# Patient Record
Sex: MALE | Race: WHITE | URBAN - METROPOLITAN AREA
[De-identification: names, ages, dates, MRNs, and addresses within clinical notes are randomized per-mention and may not be internally consistent; named-entity substitution may affect disease eponyms.]

---

## 2017-06-10 ENCOUNTER — OFFICE VISIT (OUTPATIENT)
Dept: URGENT CARE | Facility: URGENT CARE | Age: 77
End: 2017-06-10
Payer: MEDICARE

## 2017-06-10 VITALS
WEIGHT: 169 LBS | OXYGEN SATURATION: 96 % | DIASTOLIC BLOOD PRESSURE: 80 MMHG | BODY MASS INDEX: 24.25 KG/M2 | RESPIRATION RATE: 16 BRPM | TEMPERATURE: 97.9 F | SYSTOLIC BLOOD PRESSURE: 120 MMHG | HEART RATE: 70 BPM

## 2017-06-10 DIAGNOSIS — R31.0 GROSS HEMATURIA: Primary | ICD-10-CM

## 2017-06-10 DIAGNOSIS — N30.01 ACUTE CYSTITIS WITH HEMATURIA: ICD-10-CM

## 2017-06-10 DIAGNOSIS — Z79.01 CHRONIC ANTICOAGULATION: ICD-10-CM

## 2017-06-10 DIAGNOSIS — Z85.51 H/O PRIMARY MALIGNANT NEOPLASM OF URINARY BLADDER: ICD-10-CM

## 2017-06-10 DIAGNOSIS — R82.90 NONSPECIFIC FINDING ON EXAMINATION OF URINE: ICD-10-CM

## 2017-06-10 LAB
ALBUMIN UR-MCNC: 30 MG/DL
APPEARANCE UR: ABNORMAL
BACTERIA #/AREA URNS HPF: ABNORMAL /HPF
BILIRUB UR QL STRIP: NEGATIVE
COLOR UR AUTO: ABNORMAL
GLUCOSE UR STRIP-MCNC: NEGATIVE MG/DL
HGB UR QL STRIP: ABNORMAL
KETONES UR STRIP-MCNC: NEGATIVE MG/DL
LEUKOCYTE ESTERASE UR QL STRIP: ABNORMAL
NITRATE UR QL: NEGATIVE
PH UR STRIP: 5.5 PH (ref 5–7)
RBC #/AREA URNS AUTO: >100 /HPF (ref 0–2)
SP GR UR STRIP: 1.01 (ref 1–1.03)
URN SPEC COLLECT METH UR: ABNORMAL
UROBILINOGEN UR STRIP-ACNC: 0.2 EU/DL (ref 0.2–1)
WBC #/AREA URNS AUTO: ABNORMAL /HPF (ref 0–2)

## 2017-06-10 PROCEDURE — 87088 URINE BACTERIA CULTURE: CPT | Performed by: PHYSICIAN ASSISTANT

## 2017-06-10 PROCEDURE — 87086 URINE CULTURE/COLONY COUNT: CPT | Performed by: PHYSICIAN ASSISTANT

## 2017-06-10 PROCEDURE — 87186 SC STD MICRODIL/AGAR DIL: CPT | Performed by: PHYSICIAN ASSISTANT

## 2017-06-10 PROCEDURE — 81001 URINALYSIS AUTO W/SCOPE: CPT | Performed by: FAMILY MEDICINE

## 2017-06-10 PROCEDURE — 99214 OFFICE O/P EST MOD 30 MIN: CPT | Performed by: PHYSICIAN ASSISTANT

## 2017-06-10 RX ORDER — CEFDINIR 300 MG/1
300 CAPSULE ORAL 2 TIMES DAILY
Qty: 20 CAPSULE | Refills: 0 | Status: SHIPPED | OUTPATIENT
Start: 2017-06-10 | End: 2018-05-16

## 2017-06-10 NOTE — PROGRESS NOTES
" Carlos Alberto Ortiz presents to  today for evaluation of small volume gross hematuria (bright red blood) with urination x 2 days duration. His wife accompanies him today. His primary residence is in [place] but he spends time in [place] over the summer months. His [place] PCP and all other medical specialists are in [place]. He does, however, see an MD at [facility] when here over the summer months and has an already scheduled appointment there for next week. He decided he \"probably shouldn't wait that long with the blood in my urine\" so is here now.     He denies passing any blood clots or tissue. He denies any actual dysuria, urinary urgency/frequency, obstructive voiding sxs or  suprapubic area pressure.Denies any abdominal pain or flank pain. Denies any F/C/N/V/D or other acute sxs.    UROLOGIC PMH: He has a significant past urologic hx that includes bladder cancer.  Patient tells me he had transurethral resection of a tumor \"that was 2/3 the size of my bladder\" 3 years ago. He reportedly had a urology recheck 3-4 months ago (including cysto by his report) and states, \"He told me everything was all clear. No cancer.\"     General Disposition. Despite above acute illness sxs, patient still alert, active and taking in PO solids and fluids.  Normal BM's          ROS:     GI: Denies any abdominal pain. Denies any F/C/N/V/D.   CARDIAC: Significant cardiac pmhx as per below. Denies any active CP or change in baseline SOB. Denies any acute changes or changes for many months to one year in longstanding SOB with exertion. Denies any increase in LE edema or weight gain  RESP: Severe COPD. O2 dependent. Denies any acute changes in respiratory status or any changes for many months to one year in longstanding SOB with exertion.   SKIN: Denies rash   NEURO: Denies any high fevers, confusion, weakness or mental status changes    Past Medical History:   Diagnosis Date     Atrial fibrillation (H) 7/1/2016     Biventricular ICD (implantable " cardioverter-defibrillator) in place 7/1/2016     Cancer (H)      Cardiomyopathy, unspecified (H) 7/1/2016     CHF (congestive heart failure) (H) 6/27/2016     COPD (chronic obstructive pulmonary disease) (H)      Coronary artery disease      Hx of atrioventricular node ablation 7/1/2016     Uncomplicated asthma        Current Outpatient Prescriptions:      albuterol (2.5 MG/3ML) 0.083% nebulizer solution, Take 1 vial (2.5 mg) by nebulization 4 times daily, Disp: 360 mL, Rfl: 3     dextromethorphan-guaiFENesin (MUCINEX DM)  MG per 12 hr tablet, Take 1 tablet by mouth 2 times daily, Disp: 180 tablet, Rfl: 3     furosemide (LASIX) 40 MG tablet, Take 1 tablet (40 mg) by mouth 2 times daily, Disp: 180 tablet, Rfl: 3     predniSONE (DELTASONE) 10 MG tablet, Take 4 tablets daily for 3 days then take 3 daily for 3 days then 2 daily for 3 days then 1 daily for three days then stop, Disp: 30 tablet, Rfl: 3     diphenhydrAMINE (BENADRYL) 50 MG capsule, Take 50 mg by mouth every 6 hours as needed for sleep , Disp: , Rfl:      predniSONE (DELTASONE) 5 MG tablet, Take 5 mg by mouth daily, Disp: , Rfl:      fluticasone-salmeterol (ADVAIR DISKUS) 250-50 MCG/DOSE diskus inhaler, Inhale 1 puff into the lungs 2 times daily, Disp: , Rfl:      tiotropium (SPIRIVA) 18 MCG inhalation capsule, Inhale 18 mcg into the lungs daily, Disp: , Rfl:      citalopram (CELEXA) 10 MG tablet, Take 10 mg by mouth daily, Disp: , Rfl:      metoprolol (TOPROL-XL) 100 MG 24 hr tablet, Take 100 mg by mouth daily, Disp: , Rfl:      tamsulosin (FLOMAX) 0.4 MG 24 hr capsule, Take 0.4 mg by mouth daily, Disp: , Rfl:      ramipril (ALTACE) 10 MG capsule, Take 10 mg by mouth daily, Disp: , Rfl:      montelukast (SINGULAIR) 10 MG tablet, Take 10 mg by mouth At Bedtime, Disp: , Rfl:      atorvastatin (LIPITOR) 20 MG tablet, Take 20 mg by mouth daily, Disp: , Rfl:      finasteride (PROSCAR) 5 MG tablet, Take 5 mg by mouth daily, Disp: , Rfl:      warfarin  (COUMADIN) 5 MG tablet, Take by mouth daily 5mg M,W,F, & 7.5mg all other days, Disp: , Rfl:      ALPRAZolam (XANAX) 0.5 MG tablet, Take 0.5 mg by mouth 3 times daily as needed , Disp: , Rfl:      albuterol (2.5 MG/3ML) 0.083% nebulizer solution, Take 1 vial by nebulization 2 times daily, Disp: , Rfl:     No Known Allergies     OBJECTIVE:  /80 (BP Location: Right arm, Cuff Size: Adult Large)  Pulse 70  Temp 97.9  F (36.6  C) (Oral)  Resp 16  Wt 169 lb (76.7 kg)  SpO2 96%  BMI 24.25 kg/m2      GENERAL:  Very pleasant, comfortable and generally well appearing sitting in exam chair. He is O2 dependent.  SKIN: No rashes.  Normal color.  Sclera clear.  CARDIAC:NORMAL - regular rate and rhythm. Heart sounds are distant due to COPD but no  extra heart sounds appreciated.   NECK:  No carotid bruits or JVD.  No thyromegaly or adenopathy.  RESP: Generally decreased breath sounds and wheezing throughtout  ABDOMEN:  Soft, non-tender, non-distended.  Positive normal bowel sounds.  No HSM or masses.  No suprapubic tenderness.  No CVA tenderness on either sided today with robust percussion.  NEURO: Alert and oriented.  Normal speech and mentation.  CN II/XII grossly intact.  Gait within normal limits.    EXTREMITIES:  Free of edema  NEURO: Alert and oriented.  Normal speech and mentation.  CN II/XII grossly intact.  Gait within normal limits.        Component      Latest Ref Rng & Units 6/10/2017   Color Urine       Red   Appearance Urine       Slightly Cloudy   Glucose Urine      NEG mg/dL Negative   Bilirubin Urine      NEG Negative   Ketones Urine      NEG mg/dL Negative   Specific Gravity Urine      1.003 - 1.035 1.015   Blood Urine      NEG Large (A)   pH Urine      5.0 - 7.0 pH 5.5   Protein Albumin Urine      NEG mg/dL 30 (A)   Urobilinogen Urine      0.2 - 1.0 EU/dL 0.2   Nitrite Urine      NEG Negative   Leukocyte Esterase Urine      NEG Small (A)   Source       Midstream Urine   WBC Urine      0 - 2 /HPF 25-50  "(A)   RBC Urine      0 - 2 /HPF >100 (A)   Bacteria Urine      NEG /HPF Moderate (A)           ASSESSMENT/PLAN:    Summary: Gross hematuria in a 77 y.o. Man with pmhx that includes large bladder CA. UA today does show findings consistent with probable infection.  It is reassuring that he had, by his report, a recent cystoscopy with negative findings.  However, I explained to patient and his wife today gross hematuria in the setting of h/o bladder CA needs close and short interval follow-up on Monday. He does not have any sxs or physical exam findings of pyelonephritis.  His other co-morbid medical conditions are stable/without change for many months to one year. The below printed plan (along with today's lab findings for him to hand carry to follow-up appointment on Monday) are provided to patient today.         6/10/17 Urgent Care Plan     1.  Start the Omnicef (Cefdinir) antibiotic I prescribed for your presumed bladder infection.     2.  Follow-up with your primary care doctor/primary care clinic on Monday for a recheck. Make sure your primary care doctor checks on your urine culture on Monday. Because you have a history of bladder cancer, it is very important you get this follow-up.     Your doctor will let you know if you need to be seen by a urologist.     3.  Talk to your INR nurse about taking an antibiotic as this can cause changes in your INR.     4.  If you develop any difficulty passing your urine, develop any blood clots in the urine, have any increased blood in urine, develop any abdominal pain, back pain, fever or any of the below symptoms:       Repeated vomiting; unable to keep medicine down     In addition to the above, bladder infection \"red flag\" signs and sxs are reviewed with pt both verbally and by way of printed educational material for home review.  Pt verbalizes understanding of and agrees to the above plan.       The below blue text is update of phone call follow-UP     Patient's UCX " "returned positive for 2 strains of multi-drug resistant E. Coli. Patient states he did not receive initial phone message for advised Abx change.  I personally reached him Wed morning (6/14/17). The below is copy of my phone conversation:     I phoned patient today myself.  He states he did not receive call yesterday.  I informed him he has two strains of multi-drug resistant bacteria in urine.      Patient states his gross hematuria resolved \"after the second pill\" (omnicef). He continues to deny any F/C/N/V/D, back pain, weakness, abdominal pain or any other acute illness sxs.     PLAN:      1.  Stop Omnicef   2. Start Septra DS now bid x 7 days   3. Patient tells me he already has a follow-up appointment scheduled to see his local PCP ([facility]) next Thursday.   4. I reviewed pyelonephritis and \"red flag\" sxs with him again on phone today and advised he needs to be seen STAT if any such sxs develop in setting of multi-drug resistant, multiple strain infection. He verbalizes understanding of my advise.   5.  Patient requested a copy of his urine culture report (so he can carry it to follow-up PCP visit) to be mailed to the below address:      [address]  [place] [address]        (R31.0) Gross hematuria  (primary encounter diagnosis)  Plan: UA reflex to Microscopic and Culture, Urine         Microscopic, Urine Culture Aerobic Bacterial      (Z85.51) H/O primary malignant neoplasm of urinary bladder  Plan: UA reflex to Microscopic and Culture, Urine         Microscopic, Urine Culture Aerobic Bacterial      (N30.01) Acute cystitis with hematuria  Plan: UA reflex to Microscopic and Culture, Urine         Microscopic, Urine Culture Aerobic Bacterial,         cefdinir (OMNICEF) 300 MG capsule,         sulfamethoxazole-trimethoprim (BACTRIM         DS/SEPTRA DS) 800-160 MG per tablet      (Z79.01) Chronic anticoagulation    (R82.90) Nonspecific finding on examination of urine  Plan: Urine " Culture Aerobic Bacterial

## 2017-06-10 NOTE — NURSING NOTE
"Carlos Alberto Ortiz is a 77 year old male.      Chief Complaint   Patient presents with     Urgent Care     Urinary Problem     pt is here for hemature that he thinks it may have started about 2 days ago - has a hx of bladder ca two years ago but that has been clear        Initial /80 (BP Location: Right arm, Cuff Size: Adult Large)  Pulse 70  Temp 97.9  F (36.6  C) (Oral)  Resp 16  Wt 169 lb (76.7 kg)  SpO2 96%  BMI 24.25 kg/m2 Estimated body mass index is 24.25 kg/(m^2) as calculated from the following:    Height as of 8/16/16: 5' 10\" (1.778 m).    Weight as of this encounter: 169 lb (76.7 kg).  Medication Reconciliation: complete      Questioned patient about current smoking habits.  Pt. quit smoking some time ago.      Zainab Clark CMA      "

## 2017-06-10 NOTE — PATIENT INSTRUCTIONS
"   * BLADDER INFECTION: Male (Adult)    A bladder infection (\"cystitis\" or \"UTI\") usually causes a constant urge to urinate, and a burning when passing urine. Urine may be cloudy, smelly or dark. There may be also be pain in the lower abdomen.  Cystitis in males is not common. It may be caused by a partial blockage in the urinary system that keeps the bladder from emptying completely. This is most often related to an enlarged prostate gland.  HOME CARE:  1. Drink lots of fluids (at least 6-8 glasses a day). This will flush the bacteria out of your bladder. Cranberry juice has been shown to help clear out the bacteria.  2. Avoid sexual intercourse until your symptoms are gone.  3. A bladder infection is treated with antibiotics. You may also be given Pyridium (generic - phenazopyridine) to reduce burning with urination. This will cause urine to become a bright orange color, which can stain clothing.  FOLLOW UP with your doctor or this facility if ALL symptoms have not cleared within five days. It is important to keep your follow up appointment to discuss with your doctor the need for further tests of the urinary tract.  GET PROMPT MEDICAL ATTENTION if any of the following occur:    Fever over 101  F (38.3  C)    No improvement by the third day of treatment    Increasing back or abdominal pain    Repeated vomiting; unable to keep medicine down    Weakness, dizziness or fainting    8330-2261 [employer], [address] [place], [place] [address]. All rights reserved. This information is not intended as a substitute for professional medical care. Always follow your healthcare professional's instructions.    6/10/17 Urgent Care Plan     1.  Start the Omnicef (Cefdinir) antibiotic I prescribed for your presumed bladder infection.     2.  Follow-up with your primary care doctor/primary care clinic on Monday for a recheck. Make sure your primary care doctor checks on your urine culture on Monday. Because you have a " history of bladder cancer, it is very important you get this follow-up.     Your doctor will let you know if you need to be seen by a urologist.     3.  Talk to your INR nurse about taking an antibiotic as this can cause changes in your INR.     4.  If you develop any difficulty passing your urine, develop any blood clots in the urine, have any increased blood in urine, develop any abdominal pain, back pain, fever or any of the below symptoms:       Repeated vomiting; unable to keep medicine down    Weakness, dizziness or fainting

## 2017-06-10 NOTE — MR AVS SNAPSHOT
"              After Visit Summary   6/10/2017    Carlos Alberto Ortiz    MRN: [id number]           Patient Information     Date Of Birth          1940        Visit Information        Provider Department      6/10/2017 2:20 PM Khoa Nunez PA-C [facility]        Today's Diagnoses     Acute cystitis with hematuria    -  1    Hematuria        Chronic anticoagulation        Nonspecific finding on examination of urine          Care Instructions       * BLADDER INFECTION: Male (Adult)    A bladder infection (\"cystitis\" or \"UTI\") usually causes a constant urge to urinate, and a burning when passing urine. Urine may be cloudy, smelly or dark. There may be also be pain in the lower abdomen.  Cystitis in males is not common. It may be caused by a partial blockage in the urinary system that keeps the bladder from emptying completely. This is most often related to an enlarged prostate gland.  HOME CARE:  1. Drink lots of fluids (at least 6-8 glasses a day). This will flush the bacteria out of your bladder. Cranberry juice has been shown to help clear out the bacteria.  2. Avoid sexual intercourse until your symptoms are gone.  3. A bladder infection is treated with antibiotics. You may also be given Pyridium (generic - phenazopyridine) to reduce burning with urination. This will cause urine to become a bright orange color, which can stain clothing.  FOLLOW UP with your doctor or this facility if ALL symptoms have not cleared within five days. It is important to keep your follow up appointment to discuss with your doctor the need for further tests of the urinary tract.  GET PROMPT MEDICAL ATTENTION if any of the following occur:    Fever over 101  F (38.3  C)    No improvement by the third day of treatment    Increasing back or abdominal pain    Repeated vomiting; unable to keep medicine down    Weakness, dizziness or fainting    5386-8161 [employer], [address] [place] [address], [place] " [address]. All rights reserved. This information is not intended as a substitute for professional medical care. Always follow your healthcare professional's instructions.    6/10/17 Urgent Care Plan     1.  Start the Omnicef (Cefdinir) antibiotic I prescribed for your presumed bladder infection.     2.  Follow-up with your primary care doctor/primary care clinic on Monday for a recheck. Make sure your primary care doctor checks on your urine culture on Monday. Because you have a history of bladder cancer, it is very important you get this follow-up.     Your doctor will let you know if you need to be seen by a urologist.     3.  Talk to your INR nurse about taking an antibiotic as this can cause changes in your INR.     4.  If you develop any difficulty passing your urine, develop any blood clots in the urine, have any increased blood in urine, develop any abdominal pain, back pain, fever or any of the below symptoms:       Repeated vomiting; unable to keep medicine down    Weakness, dizziness or fainting                Follow-ups after your visit        Your next 10 appointments already scheduled     Jul 13, 2017 12:30 PM CDT   US CAROTID RIGHT with [id number]   [facility] ([facility])    [address]  [facility] [address]   [phone]           Please bring a list of your medicines (including vitamins, minerals and over-the-counter drugs). Also, tell your doctor about any allergies you may have. Wear comfortable clothes and leave your valuables at home.  You do not need to do anything special to prepare for your exam.  Please call the Imaging Department at your exam site with any questions.            Jul 13, 2017  1:00 PM CDT   Return Visit with Diego Canseco MD   [facility] ([facility])    [address]  [facility] [address] [phone]             "  Who to contact     If you have questions or need follow up information about today's clinic visit or your schedule please contact [facility] directly at [phone].  Normal or non-critical lab and imaging results will be communicated to you by MyChart, letter or phone within 4 business days after the clinic has received the results. If you do not hear from us within 7 days, please contact the clinic through [facility] or phone. If you have a critical or abnormal lab result, we will notify you by phone as soon as possible.  Submit refill requests through [employer] or call your pharmacy and they will forward the refill request to us. Please allow 3 business days for your refill to be completed.          Additional Information About Your Visit        CodeEval Information     [employer] lets you send messages to your doctor, view your test results, renew your prescriptions, schedule appointments and more. To sign up, go to [email or web address] . Click on \"Log in\" on the left side of the screen, which will take you to the Welcome page. Then click on \"Sign up Now\" on the right side of the page.     You will be asked to enter the access code listed below, as well as some personal information. Please follow the directions to create your username and password.     Your access code is: [email or web address]  Expires: 2017  4:09 PM     Your access code will  in 90 days. If you need help or a new code, please call your [place] clinic or [phone].        Care EveryWhere ID     This is your Care EveryWhere ID. This could be used by other organizations to access your [facility] medical records  [id number]        Your Vitals Were     Pulse Temperature Respirations Pulse Oximetry BMI (Body Mass Index)       70 97.9  F (36.6  C) (Oral) 16 96% 24.25 kg/m2        Blood Pressure from Last 3 Encounters:   06/10/17 120/80   16 123/68   16 96/56    Weight from Last 3 Encounters:   06/10/17 169 lb (76.7 kg) "   08/16/16 165 lb (74.8 kg)   07/22/16 163 lb 3.2 oz (74 kg)              We Performed the Following     UA reflex to Microscopic and Culture     Urine Culture Aerobic Bacterial     Urine Microscopic          Today's Medication Changes          These changes are accurate as of: 6/10/17  4:09 PM.  If you have any questions, ask your nurse or doctor.               Start taking these medicines.        Dose/Directions    cefdinir 300 MG capsule   Commonly known as:  OMNICEF   Used for:  Acute cystitis with hematuria   Started by:  Khoa Nunez PA-C        Dose:  300 mg   Take 1 capsule (300 mg) by mouth 2 times daily   Quantity:  20 capsule   Refills:  0            Where to get your medicines      These medications were sent to [facility] [address] [facility] [address]  [address] [place] [address] , [facility] [address]     Phone:  [phone]     cefdinir 300 MG capsule                Primary Care Provider Fax #    [facility] [phone] [address]  [facility] [address]        Thank you!     Thank you for choosing [facility]  for your care. Our goal is always to provide you with excellent care. Hearing back from our patients is one way we can continue to improve our services. Please take a few minutes to complete the written survey that you may receive in the mail after your visit with us. Thank you!             Your Updated Medication List - Protect others around you: Learn how to safely use, store and throw away your medicines at www.disposemymeds.org.          This list is accurate as of: 6/10/17  4:09 PM.  Always use your most recent med list.                   Brand Name Dispense Instructions for use    ADVAIR DISKUS 250-50 MCG/DOSE diskus inhaler   Generic drug:  fluticasone-salmeterol      Inhale 1 puff into the lungs 2 times daily       * albuterol (2.5 MG/3ML) 0.083% neb solution      Take  1 vial by nebulization 2 times daily       * albuterol (2.5 MG/3ML) 0.083% neb solution     360 mL    Take 1 vial (2.5 mg) by nebulization 4 times daily       ALPRAZolam 0.5 MG tablet    XANAX     Take 0.5 mg by mouth 3 times daily as needed       cefdinir 300 MG capsule    OMNICEF    20 capsule    Take 1 capsule (300 mg) by mouth 2 times daily       citalopram 10 MG tablet    celeXA     Take 10 mg by mouth daily       COUMADIN 5 MG tablet   Generic drug:  warfarin      Take by mouth daily 5mg M,W,F, & 7.5mg all other days       dextromethorphan-guaiFENesin  MG per 12 hr tablet    MUCINEX DM    180 tablet    Take 1 tablet by mouth 2 times daily       diphenhydrAMINE 50 MG capsule    BENADRYL     Take 50 mg by mouth every 6 hours as needed for sleep       finasteride 5 MG tablet    PROSCAR     Take 5 mg by mouth daily       furosemide 40 MG tablet    LASIX    180 tablet    Take 1 tablet (40 mg) by mouth 2 times daily       LIPITOR 20 MG tablet   Generic drug:  atorvastatin      Take 20 mg by mouth daily       metoprolol 100 MG 24 hr tablet    TOPROL-XL     Take 100 mg by mouth daily       montelukast 10 MG tablet    SINGULAIR     Take 10 mg by mouth At Bedtime       * predniSONE 5 MG tablet    DELTASONE     Take 5 mg by mouth daily       * predniSONE 10 MG tablet    DELTASONE    30 tablet    Take 4 tablets daily for 3 days then take 3 daily for 3 days then 2 daily for 3 days then 1 daily for three days then stop       ramipril 10 MG capsule    ALTACE     Take 10 mg by mouth daily       tamsulosin 0.4 MG capsule    FLOMAX     Take 0.4 mg by mouth daily       tiotropium 18 MCG capsule    SPIRIVA     Inhale 18 mcg into the lungs daily       * Notice:  This list has 4 medication(s) that are the same as other medications prescribed for you. Read the directions carefully, and ask your doctor or other care provider to review them with you.

## 2017-06-13 ENCOUNTER — TELEPHONE (OUTPATIENT)
Dept: URGENT CARE | Facility: URGENT CARE | Age: 77
End: 2017-06-13

## 2017-06-13 NOTE — TELEPHONE ENCOUNTER
Culture and sensitivity data show two strains of E coli that have likely resistance to Omnicef.  Would recommend stopping Omnicef that was prescribed and starting Bactrim DS 1 tab PO BID x 7 days instead.  This Rx can be called to the pharmacy of the patient's choice with no refills.    --EJP--

## 2017-06-13 NOTE — TELEPHONE ENCOUNTER
Left message for patient to return call to the clinic.   Keyanna Segal CMA (AAMA) 6/13/2017 10:25 AM

## 2017-06-14 LAB
BACTERIA SPEC CULT: ABNORMAL
MICRO REPORT STATUS: ABNORMAL
MICROORGANISM SPEC CULT: ABNORMAL
MICROORGANISM SPEC CULT: ABNORMAL
SPECIMEN SOURCE: ABNORMAL

## 2017-06-14 RX ORDER — SULFAMETHOXAZOLE/TRIMETHOPRIM 800-160 MG
1 TABLET ORAL 2 TIMES DAILY
Qty: 14 TABLET | Refills: 0 | Status: SHIPPED | OUTPATIENT
Start: 2017-06-14 | End: 2018-05-16

## 2017-06-14 NOTE — TELEPHONE ENCOUNTER
"I phoned patient today myself.  He states he did not receive call yesterday.  I informed him he has two strains of multi-drug resistant bacteria in urine.     Patient states his gross hematuria resolved \"after the second pill\" (omnicef). He continues to deny any F/C/N/V/D, back pain, weakness, abdominal pain or any other acute illness sxs.    PLAN:     1.  Stop Omnicef   2. Start Septra DS now bid x 7 days   3. Patient tells me he already has a follow-up appointment scheduled to see his local PCP ([facility]) next Thursday.   4. I reviewed pyelonephritis and \"red flag\" sxs with him again on phone today and advised he needs to be seen STAT if any such sxs develop in setting of multi-drug resistant, multiple strain infection. He verbalizes understanding of my advise.   5.  Patient requested a copy of his urine culture report (so he can carry it to follow-up PCP visit) to be mailed to the below address:     [address]  [place] [address]  "

## 2017-06-14 NOTE — TELEPHONE ENCOUNTER
Letter mailed to patient.   Keyanna Segal CMA ([facility]) 6/14/2017 11:41 AM

## 2017-07-14 ENCOUNTER — TELEPHONE (OUTPATIENT)
Dept: OTHER | Facility: CLINIC | Age: 77
End: 2017-07-14

## 2017-07-14 NOTE — TELEPHONE ENCOUNTER
LM for pt informing him that he No Showed to his appt yesterday 7/13/17 with Dr. Canseco. Gave pt our number to give us a call back to reschedule this appt.

## 2018-05-16 ENCOUNTER — HOSPITAL ENCOUNTER (INPATIENT)
Facility: CLINIC | Age: 78
LOS: 3 days | Discharge: HOME | DRG: 190 | End: 2018-05-19
Attending: EMERGENCY MEDICINE | Admitting: INTERNAL MEDICINE
Payer: MEDICARE

## 2018-05-16 ENCOUNTER — APPOINTMENT (OUTPATIENT)
Dept: GENERAL RADIOLOGY | Facility: CLINIC | Age: 78
DRG: 190 | End: 2018-05-16
Attending: EMERGENCY MEDICINE
Payer: MEDICARE

## 2018-05-16 DIAGNOSIS — J44.1 COPD EXACERBATION (H): ICD-10-CM

## 2018-05-16 LAB
ANION GAP SERPL CALCULATED.3IONS-SCNC: 7 MMOL/L (ref 3–14)
BASOPHILS # BLD AUTO: 0.1 10E9/L (ref 0–0.2)
BASOPHILS NFR BLD AUTO: 0.4 %
BUN SERPL-MCNC: 18 MG/DL (ref 7–30)
CALCIUM SERPL-MCNC: 9 MG/DL (ref 8.5–10.1)
CHLORIDE SERPL-SCNC: 107 MMOL/L (ref 94–109)
CO2 BLDCOV-SCNC: 32 MMOL/L (ref 21–28)
CO2 BLDCOV-SCNC: 35 MMOL/L (ref 21–28)
CO2 SERPL-SCNC: 28 MMOL/L (ref 20–32)
CREAT SERPL-MCNC: 1.08 MG/DL (ref 0.66–1.25)
DIFFERENTIAL METHOD BLD: ABNORMAL
EOSINOPHIL # BLD AUTO: 0.1 10E9/L (ref 0–0.7)
EOSINOPHIL NFR BLD AUTO: 1 %
ERYTHROCYTE [DISTWIDTH] IN BLOOD BY AUTOMATED COUNT: 13.2 % (ref 10–15)
GFR SERPL CREATININE-BSD FRML MDRD: 66 ML/MIN/1.7M2
GLUCOSE SERPL-MCNC: 107 MG/DL (ref 70–99)
HCT VFR BLD AUTO: 40.4 % (ref 40–53)
HGB BLD-MCNC: 13 G/DL (ref 13.3–17.7)
IMM GRANULOCYTES # BLD: 0 10E9/L (ref 0–0.4)
IMM GRANULOCYTES NFR BLD: 0.3 %
LACTATE BLD-SCNC: 1 MMOL/L (ref 0.7–2.1)
LACTATE BLD-SCNC: 2.6 MMOL/L (ref 0.7–2.1)
LYMPHOCYTES # BLD AUTO: 1.3 10E9/L (ref 0.8–5.3)
LYMPHOCYTES NFR BLD AUTO: 10 %
MCH RBC QN AUTO: 30.2 PG (ref 26.5–33)
MCHC RBC AUTO-ENTMCNC: 32.2 G/DL (ref 31.5–36.5)
MCV RBC AUTO: 94 FL (ref 78–100)
MONOCYTES # BLD AUTO: 0.7 10E9/L (ref 0–1.3)
MONOCYTES NFR BLD AUTO: 5.9 %
NEUTROPHILS # BLD AUTO: 10.4 10E9/L (ref 1.6–8.3)
NEUTROPHILS NFR BLD AUTO: 82.4 %
NRBC # BLD AUTO: 0 10*3/UL
NRBC BLD AUTO-RTO: 0 /100
NT-PROBNP SERPL-MCNC: 1559 PG/ML (ref 0–1800)
PCO2 BLDV: 53 MM HG (ref 40–50)
PCO2 BLDV: 61 MM HG (ref 40–50)
PH BLDV: 7.36 PH (ref 7.32–7.43)
PH BLDV: 7.39 PH (ref 7.32–7.43)
PLATELET # BLD AUTO: 181 10E9/L (ref 150–450)
PO2 BLDV: 24 MM HG (ref 25–47)
PO2 BLDV: 35 MM HG (ref 25–47)
POTASSIUM SERPL-SCNC: 4.7 MMOL/L (ref 3.4–5.3)
RBC # BLD AUTO: 4.31 10E12/L (ref 4.4–5.9)
SAO2 % BLDV FROM PO2: 42 %
SAO2 % BLDV FROM PO2: 62 %
SODIUM SERPL-SCNC: 142 MMOL/L (ref 133–144)
TROPONIN I SERPL-MCNC: <0.015 UG/L (ref 0–0.04)
WBC # BLD AUTO: 12.6 10E9/L (ref 4–11)

## 2018-05-16 PROCEDURE — A9270 NON-COVERED ITEM OR SERVICE: HCPCS | Mod: GY | Performed by: EMERGENCY MEDICINE

## 2018-05-16 PROCEDURE — 84484 ASSAY OF TROPONIN QUANT: CPT | Performed by: EMERGENCY MEDICINE

## 2018-05-16 PROCEDURE — 25000128 H RX IP 250 OP 636: Performed by: EMERGENCY MEDICINE

## 2018-05-16 PROCEDURE — 71045 X-RAY EXAM CHEST 1 VIEW: CPT

## 2018-05-16 PROCEDURE — 99207 ZZC CDG-HISTORY COMP: MEETS EXP. PROB FOCUSED- DOWN CODED LACK OF PFSH: CPT | Performed by: INTERNAL MEDICINE

## 2018-05-16 PROCEDURE — 83605 ASSAY OF LACTIC ACID: CPT

## 2018-05-16 PROCEDURE — 25000132 ZZH RX MED GY IP 250 OP 250 PS 637: Mod: GY | Performed by: EMERGENCY MEDICINE

## 2018-05-16 PROCEDURE — 99221 1ST HOSP IP/OBS SF/LOW 40: CPT | Mod: AI | Performed by: INTERNAL MEDICINE

## 2018-05-16 PROCEDURE — 83880 ASSAY OF NATRIURETIC PEPTIDE: CPT | Performed by: EMERGENCY MEDICINE

## 2018-05-16 PROCEDURE — 25000125 ZZHC RX 250: Performed by: EMERGENCY MEDICINE

## 2018-05-16 PROCEDURE — 99285 EMERGENCY DEPT VISIT HI MDM: CPT | Mod: 25

## 2018-05-16 PROCEDURE — 80048 BASIC METABOLIC PNL TOTAL CA: CPT | Performed by: EMERGENCY MEDICINE

## 2018-05-16 PROCEDURE — 96361 HYDRATE IV INFUSION ADD-ON: CPT

## 2018-05-16 PROCEDURE — 25000125 ZZHC RX 250

## 2018-05-16 PROCEDURE — 85025 COMPLETE CBC W/AUTO DIFF WBC: CPT | Performed by: EMERGENCY MEDICINE

## 2018-05-16 PROCEDURE — 12000000 ZZH R&B MED SURG/OB

## 2018-05-16 PROCEDURE — 96374 THER/PROPH/DIAG INJ IV PUSH: CPT

## 2018-05-16 PROCEDURE — 93005 ELECTROCARDIOGRAM TRACING: CPT

## 2018-05-16 PROCEDURE — 94640 AIRWAY INHALATION TREATMENT: CPT

## 2018-05-16 PROCEDURE — 82803 BLOOD GASES ANY COMBINATION: CPT

## 2018-05-16 RX ORDER — FUROSEMIDE 40 MG
40 TABLET ORAL DAILY
COMMUNITY

## 2018-05-16 RX ORDER — MONTELUKAST SODIUM 10 MG/1
10 TABLET ORAL AT BEDTIME
Status: DISCONTINUED | OUTPATIENT
Start: 2018-05-16 | End: 2018-05-19 | Stop reason: HOSPADM

## 2018-05-16 RX ORDER — LORATADINE 10 MG/1
10 TABLET ORAL ONCE
Status: COMPLETED | OUTPATIENT
Start: 2018-05-16 | End: 2018-05-16

## 2018-05-16 RX ORDER — POLYETHYLENE GLYCOL 3350 17 G/17G
17 POWDER, FOR SOLUTION ORAL DAILY PRN
Status: DISCONTINUED | OUTPATIENT
Start: 2018-05-16 | End: 2018-05-19 | Stop reason: HOSPADM

## 2018-05-16 RX ORDER — ONDANSETRON 2 MG/ML
4 INJECTION INTRAMUSCULAR; INTRAVENOUS EVERY 6 HOURS PRN
Status: DISCONTINUED | OUTPATIENT
Start: 2018-05-16 | End: 2018-05-19 | Stop reason: HOSPADM

## 2018-05-16 RX ORDER — METOPROLOL SUCCINATE 50 MG/1
50 TABLET, EXTENDED RELEASE ORAL 2 TIMES DAILY
COMMUNITY

## 2018-05-16 RX ORDER — FUROSEMIDE 40 MG
40 TABLET ORAL
Status: DISCONTINUED | OUTPATIENT
Start: 2018-05-17 | End: 2018-05-17

## 2018-05-16 RX ORDER — ALBUTEROL SULFATE 0.83 MG/ML
1 SOLUTION RESPIRATORY (INHALATION) 4 TIMES DAILY
Status: DISCONTINUED | OUTPATIENT
Start: 2018-05-17 | End: 2018-05-19 | Stop reason: HOSPADM

## 2018-05-16 RX ORDER — FUROSEMIDE 40 MG
40 TABLET ORAL DAILY PRN
Status: ON HOLD | COMMUNITY
End: 2018-05-19

## 2018-05-16 RX ORDER — METOPROLOL SUCCINATE 50 MG/1
50 TABLET, EXTENDED RELEASE ORAL 2 TIMES DAILY
Status: DISCONTINUED | OUTPATIENT
Start: 2018-05-16 | End: 2018-05-19 | Stop reason: HOSPADM

## 2018-05-16 RX ORDER — ALBUTEROL SULFATE 0.83 MG/ML
SOLUTION RESPIRATORY (INHALATION)
Status: COMPLETED
Start: 2018-05-16 | End: 2018-05-16

## 2018-05-16 RX ORDER — SODIUM CHLORIDE 9 MG/ML
INJECTION, SOLUTION INTRAVENOUS CONTINUOUS
Status: DISCONTINUED | OUTPATIENT
Start: 2018-05-16 | End: 2018-05-16

## 2018-05-16 RX ORDER — IPRATROPIUM BROMIDE AND ALBUTEROL SULFATE 2.5; .5 MG/3ML; MG/3ML
3 SOLUTION RESPIRATORY (INHALATION) ONCE
Status: COMPLETED | OUTPATIENT
Start: 2018-05-16 | End: 2018-05-16

## 2018-05-16 RX ORDER — FINASTERIDE 5 MG/1
5 TABLET, FILM COATED ORAL DAILY
Status: DISCONTINUED | OUTPATIENT
Start: 2018-05-17 | End: 2018-05-19 | Stop reason: HOSPADM

## 2018-05-16 RX ORDER — TAMSULOSIN HYDROCHLORIDE 0.4 MG/1
0.4 CAPSULE ORAL EVERY EVENING
Status: DISCONTINUED | OUTPATIENT
Start: 2018-05-16 | End: 2018-05-19 | Stop reason: HOSPADM

## 2018-05-16 RX ORDER — ALBUTEROL SULFATE 5 MG/ML
2.5 SOLUTION RESPIRATORY (INHALATION) ONCE
Status: COMPLETED | OUTPATIENT
Start: 2018-05-16 | End: 2018-05-16

## 2018-05-16 RX ORDER — ALBUTEROL SULFATE 90 UG/1
1-2 AEROSOL, METERED RESPIRATORY (INHALATION) EVERY 6 HOURS PRN
COMMUNITY

## 2018-05-16 RX ORDER — NALOXONE HYDROCHLORIDE 0.4 MG/ML
.1-.4 INJECTION, SOLUTION INTRAMUSCULAR; INTRAVENOUS; SUBCUTANEOUS
Status: DISCONTINUED | OUTPATIENT
Start: 2018-05-16 | End: 2018-05-19 | Stop reason: HOSPADM

## 2018-05-16 RX ORDER — METHYLPREDNISOLONE SODIUM SUCCINATE 125 MG/2ML
60 INJECTION, POWDER, LYOPHILIZED, FOR SOLUTION INTRAMUSCULAR; INTRAVENOUS ONCE
Status: COMPLETED | OUTPATIENT
Start: 2018-05-16 | End: 2018-05-16

## 2018-05-16 RX ORDER — LIDOCAINE 40 MG/G
CREAM TOPICAL
Status: DISCONTINUED | OUTPATIENT
Start: 2018-05-16 | End: 2018-05-19 | Stop reason: HOSPADM

## 2018-05-16 RX ORDER — ALPRAZOLAM 0.5 MG
0.5 TABLET ORAL 2 TIMES DAILY
Status: DISCONTINUED | OUTPATIENT
Start: 2018-05-16 | End: 2018-05-19 | Stop reason: HOSPADM

## 2018-05-16 RX ORDER — POTASSIUM CHLORIDE 750 MG/1
10 TABLET, EXTENDED RELEASE ORAL DAILY
COMMUNITY

## 2018-05-16 RX ORDER — IPRATROPIUM BROMIDE AND ALBUTEROL SULFATE 2.5; .5 MG/3ML; MG/3ML
SOLUTION RESPIRATORY (INHALATION)
Status: DISCONTINUED
Start: 2018-05-16 | End: 2018-05-16 | Stop reason: HOSPADM

## 2018-05-16 RX ORDER — DEXTROSE MONOHYDRATE 25 G/50ML
25-50 INJECTION, SOLUTION INTRAVENOUS
Status: DISCONTINUED | OUTPATIENT
Start: 2018-05-16 | End: 2018-05-19 | Stop reason: HOSPADM

## 2018-05-16 RX ORDER — RAMIPRIL 5 MG/1
10 CAPSULE ORAL EVERY EVENING
Status: DISCONTINUED | OUTPATIENT
Start: 2018-05-16 | End: 2018-05-19 | Stop reason: HOSPADM

## 2018-05-16 RX ORDER — CITALOPRAM HYDROBROMIDE 10 MG/1
10 TABLET ORAL EVERY EVENING
Status: DISCONTINUED | OUTPATIENT
Start: 2018-05-16 | End: 2018-05-19 | Stop reason: HOSPADM

## 2018-05-16 RX ORDER — PREDNISONE 10 MG/1
10 TABLET ORAL 2 TIMES DAILY
COMMUNITY

## 2018-05-16 RX ORDER — ONDANSETRON 4 MG/1
4 TABLET, ORALLY DISINTEGRATING ORAL EVERY 6 HOURS PRN
Status: DISCONTINUED | OUTPATIENT
Start: 2018-05-16 | End: 2018-05-19 | Stop reason: HOSPADM

## 2018-05-16 RX ORDER — METHYLPREDNISOLONE SODIUM SUCCINATE 125 MG/2ML
60 INJECTION, POWDER, LYOPHILIZED, FOR SOLUTION INTRAMUSCULAR; INTRAVENOUS 2 TIMES DAILY
Status: DISCONTINUED | OUTPATIENT
Start: 2018-05-17 | End: 2018-05-19 | Stop reason: HOSPADM

## 2018-05-16 RX ORDER — NIFEDIPINE 30 MG/1
60 TABLET, EXTENDED RELEASE ORAL EVERY EVENING
Status: DISCONTINUED | OUTPATIENT
Start: 2018-05-16 | End: 2018-05-19 | Stop reason: HOSPADM

## 2018-05-16 RX ORDER — NITROGLYCERIN 0.4 MG/1
0.4 TABLET SUBLINGUAL EVERY 5 MIN PRN
Status: DISCONTINUED | OUTPATIENT
Start: 2018-05-16 | End: 2018-05-19 | Stop reason: HOSPADM

## 2018-05-16 RX ORDER — METFORMIN HCL 500 MG
500 TABLET, EXTENDED RELEASE 24 HR ORAL
COMMUNITY

## 2018-05-16 RX ORDER — ATORVASTATIN CALCIUM 20 MG/1
20 TABLET, FILM COATED ORAL DAILY
Status: DISCONTINUED | OUTPATIENT
Start: 2018-05-17 | End: 2018-05-19 | Stop reason: HOSPADM

## 2018-05-16 RX ORDER — ACETAMINOPHEN 325 MG/1
650 TABLET ORAL EVERY 4 HOURS PRN
Status: DISCONTINUED | OUTPATIENT
Start: 2018-05-16 | End: 2018-05-19 | Stop reason: HOSPADM

## 2018-05-16 RX ORDER — NICOTINE POLACRILEX 4 MG
15-30 LOZENGE BUCCAL
Status: DISCONTINUED | OUTPATIENT
Start: 2018-05-16 | End: 2018-05-19 | Stop reason: HOSPADM

## 2018-05-16 RX ORDER — IPRATROPIUM BROMIDE AND ALBUTEROL SULFATE 2.5; .5 MG/3ML; MG/3ML
SOLUTION RESPIRATORY (INHALATION)
Status: COMPLETED
Start: 2018-05-16 | End: 2018-05-16

## 2018-05-16 RX ADMIN — ALBUTEROL SULFATE 2.5 MG: 2.5 SOLUTION RESPIRATORY (INHALATION) at 20:06

## 2018-05-16 RX ADMIN — IPRATROPIUM BROMIDE AND ALBUTEROL SULFATE 3 ML: .5; 3 SOLUTION RESPIRATORY (INHALATION) at 19:55

## 2018-05-16 RX ADMIN — LORATADINE 10 MG: 10 TABLET ORAL at 21:47

## 2018-05-16 RX ADMIN — METHYLPREDNISOLONE SODIUM SUCCINATE 62.5 MG: 125 INJECTION, POWDER, FOR SOLUTION INTRAMUSCULAR; INTRAVENOUS at 20:32

## 2018-05-16 RX ADMIN — IPRATROPIUM BROMIDE AND ALBUTEROL SULFATE 3 ML: 2.5; .5 SOLUTION RESPIRATORY (INHALATION) at 19:55

## 2018-05-16 RX ADMIN — SODIUM CHLORIDE: 9 INJECTION, SOLUTION INTRAVENOUS at 20:32

## 2018-05-16 ASSESSMENT — ENCOUNTER SYMPTOMS
NAUSEA: 0
SHORTNESS OF BREATH: 1
COUGH: 1
VOMITING: 0
RHINORRHEA: 1

## 2018-05-16 NOTE — IP AVS SNAPSHOT
MRN:[id number]                      After Visit Summary   5/16/2018    Carlos Alberto Ortiz    MRN: [id number]           Thank you!     Thank you for choosing [facility] for your care. Our goal is always to provide you with excellent care. Hearing back from our patients is one way we can continue to improve our services. Please take a few minutes to complete the written survey that you may receive in the mail after you visit. If you would like to speak to someone directly about your visit please contact Patient Relations at [phone]. Thank you!          Patient Information     Date Of Birth          1940        Designated Caregiver       Most Recent Value    Caregiver    Will someone help with your care after discharge? yes    Name of designated caregiver Lida    Phone number of caregiver [phone]    Caregiver address [address] [address] [place] [address]      About your hospital stay     You were admitted on:  May 16, 2018 You last received care in the:  [place] [address] [facility]    You were discharged on:  May 19, 2018        Reason for your hospital stay                 Who to Call     For medical emergencies, please call 911.  For non-urgent questions about your medical care, please call your primary care provider or clinic, [phone]          Attending Provider     Provider Specialty    Bev Carreon MD Emergency Medicine    Hernando, Sonny Valencia MD Internal Medicine       Primary Care Provider Office Phone # Fax #    Bobo Damico [phone]      After Care Instructions     Activity       Your activity upon discharge: activity as tolerated            Diet       Follow this diet upon discharge: Orders Placed This Encounter      Combination Diet Regular Diet Adult; [id number] Calories: Moderate Consistent CHO (4-6 CHO units/meal); 2 gm NA Diet                  Follow-up Appointments     Follow-up and recommended labs and tests        Follow up  "with primary care provider, 7 days for hospital follow- up.  The following labs/tests are recommended: CBC, BMP.                  Further instructions from your care team       Your hospital follow up appointment has been schedule for you with Dr. Damico at [facility] for 18 at 1pm. Please bring your hospital discharge instructions and any new medications with you to your appointment. Please call the clinic at [phone] if you need to reschedule.      Pending Results     No orders found from 2018 to 2018.            Statement of Approval     Ordered          18 1358  I have reviewed and agree with all the recommendations and orders detailed in this document.  EFFECTIVE NOW     Approved and electronically signed by:  Refugio Mir MD             Admission Information     Date & Time Provider Department Dept. Phone    2018 Sonny Villagomez MD [place] [address] [facility] [phone]      Your Vitals Were     Blood Pressure Pulse Temperature Respirations Height Weight    140/74 (BP Location: Right arm) 66 96.5  F (35.8  C) (Oral) 18 1.778 m (5' 10\") 80.6 kg (177 lb 12.8 oz)    Pulse Oximetry BMI (Body Mass Index)                98% 25.51 kg/m2          [employer] Information     [employer] lets you send messages to your doctor, view your test results, renew your prescriptions, schedule appointments and more. To sign up, go to [email or web address] . Click on \"Log in\" on the left side of the screen, which will take you to the Welcome page. Then click on \"Sign up Now\" on the right side of the page.     You will be asked to enter the access code listed below, as well as some personal information. Please follow the directions to create your username and password.     Your access code is: [email or web address]  Expires: 2018  3:43 PM     Your access code will  in 90 days. If you need help or a new code, please call your [facility] clinic or " [phone].        Care EveryWhere ID     This is your Care EveryWhere ID. This could be used by other organizations to access your [facility] medical records  [id number]        Equal Access to Services     AMBER VÁSQUEZ : Hadii aad ku hadmiyajessica Katherineamberly, watemitopeda luqadaha, qaybta kaalmada ellen, thanh diaz trinidadkyra muse pinky malloy. So [facility] [phone].    ATENCIÓN: Si habla español, tiene a torres disposición servicios gratuitos de asistencia lingüística. Llame al [phone].    We comply with applicable federal civil rights laws and [place] laws. We do not discriminate on the basis of race, color, national origin, age, disability, sex, sexual orientation, or gender identity.               Review of your medicines      CONTINUE these medicines which may have CHANGED, or have new prescriptions. If we are uncertain of the size of tablets/capsules you have at home, strength may be listed as something that might have changed.        Dose / Directions    LASIX 40 MG tablet   This may have changed:  Another medication with the same name was removed. Continue taking this medication, and follow the directions you see here.   Generic drug:  furosemide        Dose:  40 mg   Take 40 mg by mouth daily   Refills:  0       * predniSONE 10 MG tablet   Commonly known as:  DELTASONE   This may have changed:  Another medication with the same name was added. Make sure you understand how and when to take each.        Dose:  10 mg   Take 10 mg by mouth 2 times daily   Refills:  0       * predniSONE 10 MG tablet   Commonly known as:  DELTASONE   This may have changed:  You were already taking a medication with the same name, and this prescription was added. Make sure you understand how and when to take each.   Used for:  COPD exacerbation (H)        6 tabs daily for 3 days, then 4 tabs daily for 3 days, then start your own home previous dose.   Quantity:  30 tablet   Refills:  0       * Notice:  This list has 2 medication(s)  that are the same as other medications prescribed for you. Read the directions carefully, and ask your doctor or other care provider to review them with you.      CONTINUE these medicines which have NOT CHANGED        Dose / Directions    ADVAIR DISKUS 250-50 MCG/DOSE diskus inhaler   Generic drug:  fluticasone-salmeterol        Dose:  1 puff   Inhale 1 puff into the lungs 2 times daily   Refills:  0       * albuterol 108 (90 Base) MCG/ACT Inhaler   Commonly known as:  PROAIR HFA/PROVENTIL HFA/VENTOLIN HFA        Dose:  1-2 puff   Inhale 1-2 puffs into the lungs every 6 hours as needed for shortness of breath / dyspnea or wheezing   Refills:  0       * albuterol (2.5 MG/3ML) 0.083% neb solution   Used for:  COPD exacerbation (H), Acute on chronic systolic congestive heart failure (H)        Dose:  1 vial   Take 1 vial (2.5 mg) by nebulization 4 times daily   Quantity:  360 mL   Refills:  3       ALPRAZolam 0.5 MG tablet   Commonly known as:  XANAX        Dose:  0.5 mg   Take 0.5 mg by mouth 2 times daily   Refills:  0       citalopram 10 MG tablet   Commonly known as:  celeXA        Dose:  10 mg   Take 10 mg by mouth every evening   Refills:  0       ELIQUIS 5 MG tablet   Generic drug:  apixaban ANTICOAGULANT        Dose:  5 mg   Take 5 mg by mouth 2 times daily   Refills:  0       finasteride 5 MG tablet   Commonly known as:  PROSCAR        Dose:  5 mg   Take 5 mg by mouth daily   Refills:  0       LIPITOR 20 MG tablet   Generic drug:  atorvastatin        Dose:  20 mg   Take 20 mg by mouth daily   Refills:  0       metFORMIN 500 MG 24 hr tablet   Commonly known as:  GLUCOPHAGE-XR        Dose:  500 mg   Take 500 mg by mouth daily (with dinner)   Refills:  0       metoprolol succinate 50 MG 24 hr tablet   Commonly known as:  TOPROL-XL        Dose:  50 mg   Take 50 mg by mouth 2 times daily   Refills:  0       montelukast 10 MG tablet   Commonly known as:  SINGULAIR        Dose:  10 mg   Take 10 mg by mouth At Bedtime    Refills:  0       NIFEdipine ER 60 MG 24 hr tablet   Commonly known as:  ADALAT CC        Dose:  60 mg   Take 60 mg by mouth every evening   Refills:  0       potassium chloride SA 10 MEQ CR tablet   Commonly known as:  K-DUR/KLOR-CON M        Dose:  10 mEq   Take 10 mEq by mouth daily   Refills:  0       ramipril 10 MG capsule   Commonly known as:  ALTACE        Dose:  10 mg   Take 10 mg by mouth every evening   Refills:  0       tamsulosin 0.4 MG capsule   Commonly known as:  FLOMAX        Dose:  0.4 mg   Take 0.4 mg by mouth every evening   Refills:  0       tiotropium 18 MCG capsule   Commonly known as:  SPIRIVA        Dose:  18 mcg   Inhale 18 mcg into the lungs daily   Refills:  0       * Notice:  This list has 2 medication(s) that are the same as other medications prescribed for you. Read the directions carefully, and ask your doctor or other care provider to review them with you.         Where to get your medicines      These medications were sent to [facility] [address] [address] [address]     Phone:  [phone]     predniSONE 10 MG tablet                Protect others around you: Learn how to safely use, store and throw away your medicines at www.disposemymeds.org.             Medication List: This is a list of all your medications and when to take them. Check marks below indicate your daily home schedule. Keep this list as a reference.      Medications           Morning Afternoon Evening Bedtime As Needed    ADVAIR DISKUS 250-50 MCG/DOSE diskus inhaler   Inhale 1 puff into the lungs 2 times daily   Generic drug:  fluticasone-salmeterol   Next Dose Due:  Continue home regimen                                 * albuterol 108 (90 Base) MCG/ACT Inhaler   Commonly known as:  PROAIR HFA/PROVENTIL HFA/VENTOLIN HFA   Inhale 1-2 puffs into the lungs every 6 hours as needed for shortness of breath / dyspnea or wheezing   Next Dose Due:   Continue home regimen                                 * albuterol (2.5 MG/3ML) 0.083% neb solution   Take 1 vial (2.5 mg) by nebulization 4 times daily   Last time this was given:  2.5 mg on 5/19/2018  3:20 PM   Next Dose Due:  8pm tonight (5/19)                                            ALPRAZolam 0.5 MG tablet   Commonly known as:  XANAX   Take 0.5 mg by mouth 2 times daily   Last time this was given:  0.5 mg on 5/19/2018  8:35 AM   Next Dose Due:  9pm tonight (5/19)                                      citalopram 10 MG tablet   Commonly known as:  celeXA   Take 10 mg by mouth every evening   Last time this was given:  10 mg on 5/18/2018  7:35 PM   Next Dose Due:  This evening (5/19)                                   ELIQUIS 5 MG tablet   Take 5 mg by mouth 2 times daily   Last time this was given:  5 mg on 5/19/2018  8:35 AM   Generic drug:  apixaban ANTICOAGULANT   Next Dose Due:  9pm Tonight (5/19)                                      finasteride 5 MG tablet   Commonly known as:  PROSCAR   Take 5 mg by mouth daily   Last time this was given:  5 mg on 5/19/2018  8:35 AM   Next Dose Due:  9am tomorrow (5/20)                                   LASIX 40 MG tablet   Take 40 mg by mouth daily   Last time this was given:  40 mg on 5/19/2018  8:35 AM   Generic drug:  furosemide   Next Dose Due:  9AM tomorrow (5/20)                                   LIPITOR 20 MG tablet   Take 20 mg by mouth daily   Last time this was given:  20 mg on 5/19/2018  8:35 AM   Generic drug:  atorvastatin   Next Dose Due:  9AM tomorrow (5/20)                                   metFORMIN 500 MG 24 hr tablet   Commonly known as:  GLUCOPHAGE-XR   Take 500 mg by mouth daily (with dinner)   Next Dose Due:  Continue home regimen                                metoprolol succinate 50 MG 24 hr tablet   Commonly known as:  TOPROL-XL   Take 50 mg by mouth 2 times daily   Last time this was given:  50 mg on 5/19/2018  8:35 AM                                 montelukast 10 MG tablet   Commonly known as:  SINGULAIR   Take 10 mg by mouth At Bedtime   Last time this was given:  10 mg on 5/18/2018  9:21 PM   Next Dose Due:  Tonight (5/19) at bedtime                                     NIFEdipine ER 60 MG 24 hr tablet   Commonly known as:  ADALAT CC   Take 60 mg by mouth every evening   Last time this was given:  60 mg on 5/18/2018  7:35 PM   Next Dose Due:  This evening (5/19)                                   potassium chloride SA 10 MEQ CR tablet   Commonly known as:  K-DUR/KLOR-CON M   Take 10 mEq by mouth daily   Next Dose Due:  Continue home regimen                                * predniSONE 10 MG tablet   Commonly known as:  DELTASONE   Take 10 mg by mouth 2 times daily   Next Dose Due:  5/26 resume home regimen                                      * predniSONE 10 MG tablet   Commonly known as:  DELTASONE   6 tabs daily for 3 days, then 4 tabs daily for 3 days, then start your own home previous dose.   Next Dose Due:  Tomorrow AM (5/20)            6 tabs for 3 days  4 tabs for 3 days  Then home dose                       ramipril 10 MG capsule   Commonly known as:  ALTACE   Take 10 mg by mouth every evening   Last time this was given:  10 mg on 5/18/2018  7:36 PM   Next Dose Due:  This evening (5/19)                                   tamsulosin 0.4 MG capsule   Commonly known as:  FLOMAX   Take 0.4 mg by mouth every evening   Last time this was given:  0.4 mg on 5/18/2018  7:36 PM   Next Dose Due:  This evening (5/19)                                   tiotropium 18 MCG capsule   Commonly known as:  SPIRIVA   Inhale 18 mcg into the lungs daily   Next Dose Due:  Continue home regimen                                * Notice:  This list has 4 medication(s) that are the same as other medications prescribed for you. Read the directions carefully, and ask your doctor or other care provider to review them with you.

## 2018-05-16 NOTE — LETTER
Transition Communication Hand-off for Care Transitions to Next Level of Care Provider    Name: Carlos Alberto Ortiz  : 1940  MRN #: [id number]  Primary Care Provider: CRISTOPHER HOLDEN  Primary Care MD Name: Cristopher Holden  Primary Clinic: [facility] [address]  [facility] [address]  Primary Care Clinic Name: [facility]  Reason for Hospitalization:  COPD exacerbation (H) [J44.1]  Admit Date/Time: 2018  7:43 PM  Discharge Date: 18  Payor Source: Payor: MEDICARE / Plan: MEDICARE / Product Type: Medicare /     Readmission Assessment Measure (OSMEL) Risk Score/category: Average           Reason for Communication Hand-off Referral: Admission diagnoses: COPD    Discharge Plan: home       Concern for non-adherence with plan of care:   Y/N n  Discharge Needs Assessment:  Needs       Most Recent Value    Equipment Currently Used at Home grab bar [has shower chair]    Transportation Available car, family or friend will provide    # of Referrals Placed by CTS Scheduled Follow-up appointments          Follow-up specialty is recommended: No    Follow-up plan:  No future appointments.    Any outstanding tests or procedures:  none        Key Recommendations:  COPD action plan reviewed and given to pt. Pt lives in [place] for 8 months out of the year and the summer months in [place] to watch is grandkids play ball. Pt needs reminders/encouragement to seek medical help right away when he is sick to help prevent another hospital admission with his COPD.    Erlinda Rodriguez    AVS/Discharge Summary is the source of truth; this is a helpful guide for improved communication of patient story

## 2018-05-16 NOTE — Clinical Note
Admitting Physician: WATSON ECHOLS  [[id number]]   [id number]   Bed Type: Observation Unit [54]   Special needs: Fall Risk [8]   Bed request comments: bed request at 8861

## 2018-05-16 NOTE — IP AVS SNAPSHOT
[place] [address] [facility]    [address]    [facility] [address]    Phone:  [phone]    Fax:  [phone]                                       After Visit Summary   5/16/2018    Carlos Alberto Ortiz    MRN: [id number]           After Visit Summary Signature Page     I have received my discharge instructions, and my questions have been answered. I have discussed any challenges I see with this plan with the nurse or doctor.    ..........................................................................................................................................  Patient/Patient Representative Signature      ..........................................................................................................................................  Patient Representative Print Name and Relationship to Patient    ..................................................               ................................................  Date                                            Time    ..........................................................................................................................................  Reviewed by Signature/Title    ...................................................              ..............................................  Date                                                            Time

## 2018-05-17 ENCOUNTER — APPOINTMENT (OUTPATIENT)
Dept: CARDIOLOGY | Facility: CLINIC | Age: 78
DRG: 190 | End: 2018-05-17
Attending: INTERNAL MEDICINE
Payer: MEDICARE

## 2018-05-17 ENCOUNTER — APPOINTMENT (OUTPATIENT)
Dept: OCCUPATIONAL THERAPY | Facility: CLINIC | Age: 78
DRG: 190 | End: 2018-05-17
Attending: INTERNAL MEDICINE
Payer: MEDICARE

## 2018-05-17 LAB
ALBUMIN UR-MCNC: NEGATIVE MG/DL
ANION GAP SERPL CALCULATED.3IONS-SCNC: 9 MMOL/L (ref 3–14)
APPEARANCE UR: CLEAR
BACTERIA #/AREA URNS HPF: ABNORMAL /HPF
BASOPHILS # BLD AUTO: 0 10E9/L (ref 0–0.2)
BASOPHILS NFR BLD AUTO: 0.1 %
BILIRUB UR QL STRIP: NEGATIVE
BUN SERPL-MCNC: 22 MG/DL (ref 7–30)
CALCIUM SERPL-MCNC: 8.4 MG/DL (ref 8.5–10.1)
CHLORIDE SERPL-SCNC: 107 MMOL/L (ref 94–109)
CO2 SERPL-SCNC: 26 MMOL/L (ref 20–32)
COLOR UR AUTO: YELLOW
CREAT SERPL-MCNC: 1.08 MG/DL (ref 0.66–1.25)
DIFFERENTIAL METHOD BLD: ABNORMAL
EOSINOPHIL # BLD AUTO: 0 10E9/L (ref 0–0.7)
EOSINOPHIL NFR BLD AUTO: 0.1 %
ERYTHROCYTE [DISTWIDTH] IN BLOOD BY AUTOMATED COUNT: 13 % (ref 10–15)
GFR SERPL CREATININE-BSD FRML MDRD: 66 ML/MIN/1.7M2
GLUCOSE BLDC GLUCOMTR-MCNC: 112 MG/DL (ref 70–99)
GLUCOSE BLDC GLUCOMTR-MCNC: 134 MG/DL (ref 70–99)
GLUCOSE BLDC GLUCOMTR-MCNC: 183 MG/DL (ref 70–99)
GLUCOSE BLDC GLUCOMTR-MCNC: 207 MG/DL (ref 70–99)
GLUCOSE SERPL-MCNC: 135 MG/DL (ref 70–99)
GLUCOSE UR STRIP-MCNC: NEGATIVE MG/DL
HBA1C MFR BLD: 6.2 % (ref 0–5.6)
HCT VFR BLD AUTO: 37.1 % (ref 40–53)
HGB BLD-MCNC: 11.7 G/DL (ref 13.3–17.7)
HGB UR QL STRIP: NEGATIVE
HYALINE CASTS #/AREA URNS LPF: 25 /LPF (ref 0–2)
IMM GRANULOCYTES # BLD: 0.1 10E9/L (ref 0–0.4)
IMM GRANULOCYTES NFR BLD: 0.6 %
INTERPRETATION ECG - MUSE: NORMAL
KETONES UR STRIP-MCNC: 20 MG/DL
LEUKOCYTE ESTERASE UR QL STRIP: ABNORMAL
LYMPHOCYTES # BLD AUTO: 0.3 10E9/L (ref 0.8–5.3)
LYMPHOCYTES NFR BLD AUTO: 4.2 %
MAGNESIUM SERPL-MCNC: 2 MG/DL (ref 1.6–2.3)
MCH RBC QN AUTO: 29.8 PG (ref 26.5–33)
MCHC RBC AUTO-ENTMCNC: 31.5 G/DL (ref 31.5–36.5)
MCV RBC AUTO: 94 FL (ref 78–100)
MONOCYTES # BLD AUTO: 0.1 10E9/L (ref 0–1.3)
MONOCYTES NFR BLD AUTO: 1.1 %
MUCOUS THREADS #/AREA URNS LPF: PRESENT /LPF
NEUTROPHILS # BLD AUTO: 7.4 10E9/L (ref 1.6–8.3)
NEUTROPHILS NFR BLD AUTO: 93.9 %
NITRATE UR QL: NEGATIVE
NRBC # BLD AUTO: 0 10*3/UL
NRBC BLD AUTO-RTO: 0 /100
PH UR STRIP: 5 PH (ref 5–7)
PLATELET # BLD AUTO: 143 10E9/L (ref 150–450)
POTASSIUM SERPL-SCNC: 4.9 MMOL/L (ref 3.4–5.3)
PROCALCITONIN SERPL-MCNC: <0.05 NG/ML
RBC # BLD AUTO: 3.93 10E12/L (ref 4.4–5.9)
RBC #/AREA URNS AUTO: 1 /HPF (ref 0–2)
SODIUM SERPL-SCNC: 142 MMOL/L (ref 133–144)
SOURCE: ABNORMAL
SP GR UR STRIP: 1.01 (ref 1–1.03)
SQUAMOUS #/AREA URNS AUTO: <1 /HPF (ref 0–1)
UROBILINOGEN UR STRIP-MCNC: 0 MG/DL (ref 0–2)
WBC # BLD AUTO: 7.8 10E9/L (ref 4–11)
WBC #/AREA URNS AUTO: 1 /HPF (ref 0–5)

## 2018-05-17 PROCEDURE — 25000132 ZZH RX MED GY IP 250 OP 250 PS 637: Mod: GY | Performed by: INTERNAL MEDICINE

## 2018-05-17 PROCEDURE — 97530 THERAPEUTIC ACTIVITIES: CPT | Mod: GO

## 2018-05-17 PROCEDURE — 94640 AIRWAY INHALATION TREATMENT: CPT | Mod: 76

## 2018-05-17 PROCEDURE — 25000128 H RX IP 250 OP 636: Performed by: INTERNAL MEDICINE

## 2018-05-17 PROCEDURE — 00000146 ZZHCL STATISTIC GLUCOSE BY METER IP

## 2018-05-17 PROCEDURE — 40000133 ZZH STATISTIC OT WARD VISIT

## 2018-05-17 PROCEDURE — 99232 SBSQ HOSP IP/OBS MODERATE 35: CPT | Performed by: INTERNAL MEDICINE

## 2018-05-17 PROCEDURE — 25000125 ZZHC RX 250: Performed by: INTERNAL MEDICINE

## 2018-05-17 PROCEDURE — 97165 OT EVAL LOW COMPLEX 30 MIN: CPT | Mod: GO

## 2018-05-17 PROCEDURE — 12000000 ZZH R&B MED SURG/OB

## 2018-05-17 PROCEDURE — 83735 ASSAY OF MAGNESIUM: CPT | Performed by: INTERNAL MEDICINE

## 2018-05-17 PROCEDURE — 40000275 ZZH STATISTIC RCP TIME EA 10 MIN

## 2018-05-17 PROCEDURE — 93306 TTE W/DOPPLER COMPLETE: CPT | Mod: 26 | Performed by: INTERNAL MEDICINE

## 2018-05-17 PROCEDURE — 85025 COMPLETE CBC W/AUTO DIFF WBC: CPT | Performed by: INTERNAL MEDICINE

## 2018-05-17 PROCEDURE — 97535 SELF CARE MNGMENT TRAINING: CPT | Mod: GO

## 2018-05-17 PROCEDURE — A9270 NON-COVERED ITEM OR SERVICE: HCPCS | Mod: GY | Performed by: INTERNAL MEDICINE

## 2018-05-17 PROCEDURE — 81001 URINALYSIS AUTO W/SCOPE: CPT | Performed by: INTERNAL MEDICINE

## 2018-05-17 PROCEDURE — 94640 AIRWAY INHALATION TREATMENT: CPT

## 2018-05-17 PROCEDURE — 40000274 ZZH STATISTIC RCP CONSULT EA 30 MIN

## 2018-05-17 PROCEDURE — 93306 TTE W/DOPPLER COMPLETE: CPT

## 2018-05-17 PROCEDURE — 36415 COLL VENOUS BLD VENIPUNCTURE: CPT | Performed by: INTERNAL MEDICINE

## 2018-05-17 PROCEDURE — 80048 BASIC METABOLIC PNL TOTAL CA: CPT | Performed by: INTERNAL MEDICINE

## 2018-05-17 PROCEDURE — 84145 PROCALCITONIN (PCT): CPT | Performed by: INTERNAL MEDICINE

## 2018-05-17 PROCEDURE — 83036 HEMOGLOBIN GLYCOSYLATED A1C: CPT | Performed by: INTERNAL MEDICINE

## 2018-05-17 RX ORDER — ALBUTEROL SULFATE 0.83 MG/ML
2.5 SOLUTION RESPIRATORY (INHALATION)
Status: DISCONTINUED | OUTPATIENT
Start: 2018-05-17 | End: 2018-05-19 | Stop reason: HOSPADM

## 2018-05-17 RX ORDER — FUROSEMIDE 40 MG
40 TABLET ORAL
Status: COMPLETED | OUTPATIENT
Start: 2018-05-17 | End: 2018-05-18

## 2018-05-17 RX ORDER — FUROSEMIDE 40 MG
40 TABLET ORAL DAILY
Status: DISCONTINUED | OUTPATIENT
Start: 2018-05-17 | End: 2018-05-17

## 2018-05-17 RX ADMIN — RAMIPRIL 10 MG: 5 CAPSULE ORAL at 00:32

## 2018-05-17 RX ADMIN — TAMSULOSIN HYDROCHLORIDE 0.4 MG: 0.4 CAPSULE ORAL at 19:32

## 2018-05-17 RX ADMIN — FUROSEMIDE 40 MG: 40 TABLET ORAL at 16:08

## 2018-05-17 RX ADMIN — METOPROLOL SUCCINATE 50 MG: 50 TABLET, EXTENDED RELEASE ORAL at 20:43

## 2018-05-17 RX ADMIN — METHYLPREDNISOLONE SODIUM SUCCINATE 62.5 MG: 125 INJECTION, POWDER, FOR SOLUTION INTRAMUSCULAR; INTRAVENOUS at 20:45

## 2018-05-17 RX ADMIN — ALPRAZOLAM 0.5 MG: 0.5 TABLET ORAL at 20:44

## 2018-05-17 RX ADMIN — ATORVASTATIN CALCIUM 20 MG: 20 TABLET, FILM COATED ORAL at 08:44

## 2018-05-17 RX ADMIN — ALBUTEROL SULFATE 2.5 MG: 2.5 SOLUTION RESPIRATORY (INHALATION) at 11:39

## 2018-05-17 RX ADMIN — NIFEDIPINE 60 MG: 30 TABLET, FILM COATED, EXTENDED RELEASE ORAL at 00:31

## 2018-05-17 RX ADMIN — ALBUTEROL SULFATE 2.5 MG: 2.5 SOLUTION RESPIRATORY (INHALATION) at 15:29

## 2018-05-17 RX ADMIN — APIXABAN 5 MG: 5 TABLET, FILM COATED ORAL at 00:31

## 2018-05-17 RX ADMIN — RAMIPRIL 10 MG: 5 CAPSULE ORAL at 19:32

## 2018-05-17 RX ADMIN — CITALOPRAM HYDROBROMIDE 10 MG: 10 TABLET ORAL at 19:32

## 2018-05-17 RX ADMIN — ALBUTEROL SULFATE 2.5 MG: 2.5 SOLUTION RESPIRATORY (INHALATION) at 19:57

## 2018-05-17 RX ADMIN — METHYLPREDNISOLONE SODIUM SUCCINATE 62.5 MG: 125 INJECTION, POWDER, FOR SOLUTION INTRAMUSCULAR; INTRAVENOUS at 08:41

## 2018-05-17 RX ADMIN — TAMSULOSIN HYDROCHLORIDE 0.4 MG: 0.4 CAPSULE ORAL at 00:31

## 2018-05-17 RX ADMIN — FLUTICASONE FUROATE AND VILANTEROL TRIFENATATE 1 PUFF: 200; 25 POWDER RESPIRATORY (INHALATION) at 08:08

## 2018-05-17 RX ADMIN — MONTELUKAST SODIUM 10 MG: 10 TABLET, FILM COATED ORAL at 00:31

## 2018-05-17 RX ADMIN — ALPRAZOLAM 0.5 MG: 0.5 TABLET ORAL at 08:44

## 2018-05-17 RX ADMIN — CITALOPRAM HYDROBROMIDE 10 MG: 10 TABLET ORAL at 00:30

## 2018-05-17 RX ADMIN — ALPRAZOLAM 0.5 MG: 0.5 TABLET ORAL at 00:31

## 2018-05-17 RX ADMIN — ALBUTEROL SULFATE 2.5 MG: 2.5 SOLUTION RESPIRATORY (INHALATION) at 08:07

## 2018-05-17 RX ADMIN — FINASTERIDE 5 MG: 5 TABLET, FILM COATED ORAL at 08:44

## 2018-05-17 RX ADMIN — APIXABAN 5 MG: 5 TABLET, FILM COATED ORAL at 08:44

## 2018-05-17 RX ADMIN — METOPROLOL SUCCINATE 50 MG: 50 TABLET, EXTENDED RELEASE ORAL at 11:53

## 2018-05-17 RX ADMIN — FUROSEMIDE 40 MG: 40 TABLET ORAL at 08:44

## 2018-05-17 RX ADMIN — MONTELUKAST SODIUM 10 MG: 10 TABLET, FILM COATED ORAL at 21:09

## 2018-05-17 RX ADMIN — UMECLIDINIUM 62.5 MCG: 62.5 AEROSOL, POWDER ORAL at 08:08

## 2018-05-17 RX ADMIN — APIXABAN 5 MG: 5 TABLET, FILM COATED ORAL at 20:44

## 2018-05-17 ASSESSMENT — ACTIVITIES OF DAILY LIVING (ADL)
ADLS_ACUITY_SCORE: 10

## 2018-05-17 ASSESSMENT — ENCOUNTER SYMPTOMS
EYE DISCHARGE: 1
FEVER: 0

## 2018-05-17 NOTE — PROGRESS NOTES
Infection Prevention:    Patient requires Contact precautions because of ESBL: Urine, 2017. Please contact Infection Prevention with any questions/concerns at *[phone].    Teresa Coleman, ICP

## 2018-05-17 NOTE — ED TRIAGE NOTES
A&Ox4. ABC's intact. Pt c/o SOB for the past 3 days.  States he was flying home last night from [place].  States he also has had intermittent chest pains, but denies at this time.

## 2018-05-17 NOTE — PROGRESS NOTES
Respiratory Therapy    Patient has home BIPAP, but does not have it here with him. I offered him a hospital BIPAP, but he said he did not have any issues last night and will request a BiPAP if he feels he needs one.     Vania North RRT  5/17/2018

## 2018-05-17 NOTE — PHARMACY-ADMISSION MEDICATION HISTORY
Admission medication history interview status for this patient is complete. See [facility] admission navigator for allergy information, prior to admission medications and immunization status.     Medication history interview source(s):Patient  Medication history resources (including written lists, pill bottles, clinic record):Patient list  Primary pharmacy:[facility]    Changes made to PTA medication list:  Added: Metformin, nifedipine, eliquis, albuterol inhaler  Deleted: Duplicate albuterol nebs, cefdinir, mucinex, benadryl, prednisone taper, bactrim, warfarin  Changed: Alprazolam from tid prn to bid, metoprolol xl 100 daily to 50mg bid    Actions taken by pharmacist (provider contacted, etc):None     Additional medication history information:None    Medication reconciliation/reorder completed by provider prior to medication history? No    Do you take OTC medications (eg tylenol, ibuprofen, fish oil, eye/ear drops, etc)? Y  Prior to Admission medications    Medication Sig Last Dose Taking? Auth Provider   albuterol (2.5 MG/3ML) 0.083% nebulizer solution Take 1 vial (2.5 mg) by nebulization 4 times daily 5/15/2018 at am Yes Rylie Dorado MD   ALPRAZolam (XANAX) 0.5 MG tablet Take 0.5 mg by mouth 2 times daily  5/16/2018 at am Yes Reported, Patient   apixaban ANTICOAGULANT (ELIQUIS) 5 MG tablet Take 5 mg by mouth 2 times daily 5/16/2018 at am Yes Unknown, Entered By History   atorvastatin (LIPITOR) 20 MG tablet Take 20 mg by mouth daily 5/16/2018 at am Yes Reported, Patient   citalopram (CELEXA) 10 MG tablet Take 10 mg by mouth every evening  5/15/2018 at pm Yes Reported, Patient   finasteride (PROSCAR) 5 MG tablet Take 5 mg by mouth daily 5/16/2018 at am Yes Reported, Patient   fluticasone-salmeterol (ADVAIR DISKUS) 250-50 MCG/DOSE diskus inhaler Inhale 1 puff into the lungs 2 times daily 5/16/2018 at am Yes Reported, Patient   furosemide (LASIX) 40 MG tablet Take 40 mg by mouth daily  5/16/2018 at am Yes Unknown, Entered By History   metFORMIN (GLUCOPHAGE-XR) 500 MG 24 hr tablet Take 500 mg by mouth daily (with dinner) 5/15/2018 at pm Yes Unknown, Entered By History   metoprolol succinate (TOPROL-XL) 50 MG 24 hr tablet Take 50 mg by mouth 2 times daily 5/16/2018 at am Yes Unknown, Entered By History   montelukast (SINGULAIR) 10 MG tablet Take 10 mg by mouth At Bedtime 5/15/2018 at pm Yes Reported, Patient   NIFEdipine ER (ADALAT CC) 60 MG 24 hr tablet Take 60 mg by mouth every evening 5/15/2018 at pm Yes Unknown, Entered By History   potassium chloride SA (K-DUR/KLOR-CON M) 10 MEQ CR tablet Take 10 mEq by mouth daily 5/16/2018 at am Yes Unknown, Entered By History   predniSONE (DELTASONE) 10 MG tablet Take 10 mg by mouth 2 times daily 5/16/2018 at am Yes Unknown, Entered By History   ramipril (ALTACE) 10 MG capsule Take 10 mg by mouth every evening  5/15/2018 at pm Yes Reported, Patient   tamsulosin (FLOMAX) 0.4 MG 24 hr capsule Take 0.4 mg by mouth every evening  5/15/2018 at pm Yes Reported, Patient   tiotropium (SPIRIVA) 18 MCG inhalation capsule Inhale 18 mcg into the lungs daily 5/16/2018 at am Yes Reported, Patient   albuterol (PROAIR HFA/PROVENTIL HFA/VENTOLIN HFA) 108 (90 Base) MCG/ACT Inhaler Inhale 1-2 puffs into the lungs every 6 hours as needed for shortness of breath / dyspnea or wheezing Unknown at Unknown time  Unknown, Entered By History   furosemide (LASIX) 40 MG tablet Take 40 mg by mouth daily as needed Unknown at Unknown time  Unknown, Entered By History

## 2018-05-17 NOTE — PLAN OF CARE
Problem: Patient Care Overview  Goal: Plan of Care/Patient Progress Review  Outcome: Improving  Pt continues to state shortness of breath with activity; on 2.5 lpm/NC. LS diminished, exp wheeze. IV solumedrol as ordered; no antibiotics. Echo done today. Denies pain. Up to BR in room without difficulty. Pt was cleared by OT.

## 2018-05-17 NOTE — PLAN OF CARE
Problem: Patient Care Overview  Goal: Plan of Care/Patient Progress Review  OT- eval completed, tx initiated. Pt is a 78-year-old male with a past medical history for COPD on home oxygen at 2.5 liters, chronic systolic CHF with last EF of about 25%-30% on echo in 06/2017, atrial fibrillation on anticoagulation, status post biventricular ICD, AV node ablation, and hypertension who comes to the hospital with concerns for shortness of breath. He lives in [place] 8 months out of the year and returned to [place] yesterday. In [place], he lives with his wife in a mobile home. He stated that he attends a cardiac rehab program at a hospital in [place] when he is in [place] over the summer.    Discharge Planner OT   Patient plan for discharge: home with return to cardiac rehab program  Current status: On 2Lpm via O2. Educated in PLB techniques and EC/WS during ADLs. Pt stating EC/WS that can be implemented within home routine. Encouraged pt to sit in shower and offered activity adaptations for ADLs, pt attentive and receptive. SaO2 at 97% prior to activity. O2 donned, ambulated to bathroom SBA, stood sinkside for approx. 1 min, sit<>stand comfort height toilet. All functional mobility IND/SBA to monitor O2 tubing. Noted SOB during bathroom activity, VCs provided for PLB. Pt returned to EOB, SaO2 monitored at 95% following activity. Sit>supine IND.  Barriers to return to prior living situation: none anticipated  Recommendations for discharge: home with return to cardiac rehab program  Rationale for recommendations: Pt with ability to complete ADL and functional mobility tasks while maintaining SaO2 appropriate for safe d/c home. Recommend return to cardiac rehab program (pt states he has plan to return to the program this year).       Entered by: Rani Riggs 05/17/2018 8:28 AM     Occupational Therapy Discharge Summary    Reason for therapy discharge:    All goals and outcomes met, no further needs identified.    Progress towards therapy  goal(s). See goals on Care Plan in [facility] electronic health record for goal details.  Goals met    Therapy recommendation(s):    No further therapy is recommended.  Recommend return to cardiac rehab program when medically appropriate.

## 2018-05-17 NOTE — ED NOTES
[facility]  ED Nurse Handoff Report    Carlos Alberto Ortiz is a 78 year old male   ED Chief complaint: Shortness of Breath  . ED Diagnosis:   Final diagnoses:   COPD exacerbation (H)     Allergies: No Known Allergies    Code Status: Full Code  Activity level - Baseline/Home:  Independent. Activity Level - Current:   Independent. Lift room needed: No. Bariatric: No   Needed: No   Isolation: No. Infection: Not Applicable.     Vital Signs:   Vitals:    05/16/18 2045 05/16/18 2100 05/16/18 2115 05/16/18 2130   BP: 148/66 162/73 156/68 151/78   Pulse:       Resp:  17 19 19   Temp:       SpO2:  100% 100% 100%   Weight:       Height:           Cardiac Rhythm:  ,      Pain level: 0-10 Pain Scale: 0  Patient confused: No. Patient Falls Risk: Yes.   Elimination Status: Has voided   Patient Report - Initial Complaint: increased sob over 2-3 days, worse today.  Having pain and tingling down left arm. Focused Assessment: wheezing bilat   Tests Performed: labs, vbg, . Abnormal Results:   Labs Ordered and Resulted from Time of ED Arrival Up to the Time of Departure from the ED   BASIC METABOLIC PANEL - Abnormal; Notable for the following:        Result Value    Glucose 107 (*)     All other components within normal limits   CBC WITH PLATELETS DIFFERENTIAL - Abnormal; Notable for the following:     WBC 12.6 (*)     RBC Count 4.31 (*)     Hemoglobin 13.0 (*)     Absolute Neutrophil 10.4 (*)     All other components within normal limits   ISTAT  GASES LACTATE NATANAEL POCT - Abnormal; Notable for the following:     PCO2 Venous 61 (*)     Bicarbonate Venous 35 (*)     Lactic Acid 2.6 (*)     All other components within normal limits   NT PROBNP INPATIENT   TROPONIN I   PERIPHERAL IV CATHETER   CARDIAC CONTINUOUS MONITORING   PULSE OXIMETRY NURSING   ISTAT CG4 GASES LACTATE NATANAEL NURSING POCT   ISTAT CG4 GASES LACTATE NATANAEL NURSING POCT     .   Treatments provided: akil peres,   Family Comments: went home  OBS  brochure/video discussed/provided to patient:  N/A  ED Medications:   Medications   sodium chloride 0.9% infusion ( Intravenous New Bag 5/16/18 2032)   loratadine (CLARITIN) tablet 10 mg (not administered)   albuterol (2.5 MG/3ML) 0.083% neb solution (2.5 mg  Given 5/16/18 2006)   ipratropium - albuterol 0.5 mg/2.5 mg/3 mL (DUONEB) neb solution 3 mL (3 mLs Nebulization Given 5/16/18 1955)   albuterol (PROVENTIL) neb solution 2.5 mg (2.5 mg Nebulization Given 5/16/18 2006)   methylPREDNISolone sodium succinate (solu-MEDROL) injection 62.5 mg (62.5 mg Intravenous Given 5/16/18 2032)     Drips infusing:  No  For the majority of the shift, the patient's behavior Green. Interventions performed were nebs.     Severe Sepsis OR Septic Shock Diagnosis Present: No      ED Nurse Name/Phone Number: Roger Nails,   9:42 PM    RECEIVING UNIT ED HANDOFF REVIEW    Above ED Nurse Handoff Report was reviewed: Yes  Reviewed by: Latrice Huston on May 16, 2018 at 10:33 PM

## 2018-05-17 NOTE — PLAN OF CARE
Problem: Patient Care Overview  Goal: Plan of Care/Patient Progress Review  Outcome: No Change  VSS. Afebrile. Requiring 2 L oxygen baseline wears 2.5 L at home. LS diminished with crackles scattered throughout; dyspnea on exertion but does not complain of shortness of breath while in bed. Mod CHO diet, blood sugar during night was 112. SBA to ambulate. PIV - SL. Getting IV Solumedrol. Pacemaker/Defibrilator implanted. Telemetry monitoring. Contact precautions in place. A&O, calls appropriately for assistance. DC pending.     Problem: Diabetes Comorbidity  Goal: Diabetes  Patient comorbidity will be monitored for signs and symptoms of hyperglycemia or hypoglycemia. Problems will be absent, minimized or managed by discharge/transition of care.  Outcome: No Change  BG during night was 112.

## 2018-05-17 NOTE — PROGRESS NOTES
"[facility] RCAT     Date: 5/17/2018  Admission Dx: COPD exacerbation  Pulmonary History: COPD  Home Nebulizer/MDI Use: Albuterol prn, Spiriva  Home Oxygen: none  Acuity Level (RCAT flow sheet): Level 3  Aerosol Therapy initiated: Duoneb QID, Breo QD, Incruse QD      Pulmonary Hygiene initiated: Cough and deep breathe      Volume Expansion initiated: Incentive Spirometry      Current Oxygen Requirements: 2L  Current SpO2: 95%    Re-evaluation date: 5/20/2018    Patient Education: indications for nebulizers      See \"RT Assessments\" flow sheet for patient assessment scoring and Acuity Level Details.             "

## 2018-05-17 NOTE — PROGRESS NOTES
05/17/18 0735   Quick Adds   Type of Visit Initial Occupational Therapy Evaluation   Living Environment   Lives With spouse   Living Arrangements mobile home   Home Accessibility no concerns   Number of Stairs to Enter Home 4   Number of Stairs Within Home 0   Stair Railings at Home outside, present at both sides   Transportation Available car;family or friend will provide   Living Environment Comment Pt lives in [place] for 8 months of the year. He returned to [place] yesterday. When in [place] he lives in a mobile home community with his wife. His bathroom is accessible with a walk-in shower, high toilet and grab bars.   Self-Care   Dominant Hand right   Usual Activity Tolerance moderate   Current Activity Tolerance moderate   Regular Exercise no   Equipment Currently Used at Home grab bar  (has shower chair)   Activity/Exercise/Self-Care Comment Pt wears 2.5 L O2 at baseline. He states in past month or so, he has mainly stayed indoors as the temperature in [place] has been in the 100's.   Functional Level Prior   Ambulation 0-->independent   Transferring 0-->independent   Toileting 0-->independent   Bathing 0-->independent   Dressing 0-->independent   Eating 0-->independent   Communication 0-->understands/communicates without difficulty   Swallowing 0-->swallows foods/liquids without difficulty   Cognition 0 - no cognition issues reported   Fall history within last six months no   Which of the above functional risks had a recent onset or change? bathing;dressing   Prior Functional Level Comment Pt was IND in all ADLs PTA and uses no AD for ambulation   General Information   Onset of Illness/Injury or Date of Surgery - Date 05/16/18   Referring Physician Sonny Villagomez MD   Patient/Family Goals Statement to return home   Additional Occupational Profile Info/Pertinent History of Current Problem Pt is a 78-year-old gentleman with a past medical history for COPD on home oxygen at 2.5 liters, chronic systolic CHF with last EF of  about 25%-30% on echo in 06/2017, atrial fibrillation on anticoagulation, status post biventricular ICD, AV node ablation, and hypertension who comes to the hospital with concerns for shortness of breath.  He just returned to [place] after an 8-month stay in [place] and was noticing shortness of breath, rhinorrhea, congestion, and cough even prior to his travel.  He has been unable to use his BiPAP and neb machine as it was packed for the last couple of days and finally came into the ER.  He was noticed to have tight lung sounds and given nebulizer with some improvement, and now being admitted to the hospital-per MD note.   Precautions/Limitations oxygen therapy device and L/min  (on 2Lpm)   General Info Comments Alert, oriented, pleasant   Cognitive Status Examination   Orientation orientation to person, place and time   Level of Consciousness alert   Able to Follow Commands WNL/WFL   Personal Safety (Cognitive) WNL/WFL   Cognitive Comment No deficits identified   Visual Perception   Visual Perception No deficits were identified   Sensory Examination   Sensory Comments denies n/t; states mild n/t in L hand upon admission but has since subsided   Pain Assessment   Patient Currently in Pain No   Integumentary/Edema   Integumentary/Edema Comments B feet swollen baseline   Range of Motion (ROM)   ROM Comment B UE WFL   Strength   Strength Comments B UE WFL   Hand Strength   Hand Strength Comments B WFL   Coordination   Upper Extremity Coordination No deficits were identified   Mobility   Bed Mobility Comments IND   Transfer Skills   Transfer Comments SBA for functional mobility within rooom   Transfer Skill: Sit to Stand   Level of [place]: Sit/Stand independent   Transfer Skill: Toilet Transfer   Level of [place]: Toilet stand-by assist   Physical Assist/Nonphysical Assist: Toilet verbal cues   Toilet Transfer Skill Comments VCs for PLB   Instrumental Activities of Daily Living (IADL)   Previous  "Responsibilities meal prep;laundry;housekeeping;medication management;driving   IADL Comments Wife assists with IADLs. Works in a woodshop. enjoys playing cards. Pt states he attends a cardiac rehab program in the summer in [place] and plans to participate again this year.   Activities of Daily Living Analysis   Impairments Contributing to Impaired Activities of Daily Living (SOB during ADL/IADLs)   General Therapy Interventions   Planned Therapy Interventions progressive activity/exercise;other (see comments)  (EC/WS education)   Clinical Impression   Criteria for Skilled Therapeutic Interventions Met yes, treatment indicated   OT Diagnosis Decreased ADL/IADLs   Influenced by the following impairments SOB/HORAN   Assessment of Occupational Performance 1-3 Performance Deficits   Identified Performance Deficits ADL/IADLs: bathing, dressing, leisure activities   Clinical Decision Making (Complexity) Low complexity   Therapy Frequency daily   Predicted Duration of Therapy Intervention (days/wks) 1 day   Anticipated Discharge Disposition Home  (with resumption of cardiac rehab program)   Risks and Benefits of Treatment have been explained. Yes   Patient, Family & other staff in agreement with plan of care Yes   Clinical Impression Comments Agreeable to therapy, open to suggestions   [facility] TM \"6 Clicks\"   2016, Trustees of [facility], under license to [employer].  All rights reserved.   6 Clicks Short Forms Daily Activity Inpatient Short Form   [facility] AM-PAC  \"6 Clicks\" Daily Activity Inpatient Short Form   1. Putting on and taking off regular lower body clothing? 4 - None   2. Bathing (including washing, rinsing, drying)? 3 - A Little   3. Toileting, which includes using toilet, bedpan or urinal? 4 - None   4. Putting on and taking off regular upper body clothing? 4 - None   5. Taking care of personal grooming such as brushing teeth? 4 - None   6. Eating meals? 4 - None   Daily " Activity Raw Score (Score out of 24.Lower scores equate to lower levels of function) 23   Total Evaluation Time   Total Evaluation Time (Minutes) 8

## 2018-05-17 NOTE — PLAN OF CARE
Problem: Patient Care Overview  Goal: Plan of Care/Patient Progress Review  Discharge Planner PT   Patient plan for discharge: Home  Current status: PT orders received.  Per discussion with OT, pt does not demonstrate any PT needs at this time as he is mobilizing SBA.  Will complete PT orders at this time.  Barriers to return to prior living situation: None anticipated  Recommendations for discharge: Defer to OT  Rationale for recommendations: No acute PT needs at this time.       Entered by: Lacey Freeman 05/17/2018 8:10 AM

## 2018-05-17 NOTE — ED PROVIDER NOTES
History     Chief Complaint:  Shortness of breath     The history is provided by the patient.      Carlos Alberto Ortiz is a 78 year old male who presents with shortness of breath.  Patient has a history of CHF s/p AICD and COPD and is chronically on 2.5 L of oxygen and prednisone at home.  He also takes Eliquis for AFib. He states that his shortness of breath worsened as of 3 days ago and is having significant difficulty breathing on presentation. He states his symptoms started in [place] and have progressed since returning here yesterday. Wife believes his symptoms are worsened by anxiety associated with this travel. Patient has not yet unpacked and was thus unable to use his home nebulizer treatments for his symptoms. He notes nonproductive cough, rhinorrhea, and watery eyes for a similar period of time. Patient reports no change in his lower extremity edema which he monitors closely and takes additional doses of Lasix as needed. Denies fever, nausea, vomiting, chest pain or any other associated symptoms.    Allergies:  No known drug allergies.    Medications:    Eliquis   Albuterol  Lipitor  Xanax  Proscar  Advair  Metoprolol  Singulair  Prednisone  Altace    Past Medical History:    Atrial fibrillation  Biventricular ICD  Cancer  Cardiomyopathy  Congestive heart failure  Chronic obstructive pulmonary disease  Coronary artery disease  Hypertension   Uncomplicated asthma    Past Surgical History:    Cardiac surgery  Orthopedic surgery  Vascular surgery    Family History:    Mother-diabetes, hypertension, venous disease  Father-bone cancer, venous disease  Sister-hypertension, coronary artery disease, venous disease    Social History:  Smoking status: Former smoker (quit 2002)  Alcohol use: No (quit 1981)  Marital Status:      Presents with wife  Lives in [place] in the winter    Review of Systems   Constitutional: Negative for fever.   HENT: Positive for rhinorrhea.    Eyes: Positive for discharge (and tearing).  "  Respiratory: Positive for cough and shortness of breath.    Cardiovascular: Positive for leg swelling (chronic). Negative for chest pain.   Gastrointestinal: Negative for nausea and vomiting.   All other systems reviewed and are negative.    Physical Exam     Patient Vitals for the past 24 hrs:   BP Temp Temp src Pulse Heart Rate Resp SpO2 Height Weight   05/17/18 0028 155/61 97.4  F (36.3  C) Oral - 56 20 98 % - -   05/16/18 2256 173/77 97.3  F (36.3  C) Oral 55 - 20 98 % 1.778 m (5' 10\") 83.1 kg (183 lb 4.8 oz)   05/16/18 2230 154/70 - - - 64 (!) 5 100 % - -   05/16/18 2215 159/69 - - - 58 16 99 % - -   05/16/18 2200 151/68 - - - 62 20 100 % - -   05/16/18 2145 154/66 - - - 60 16 100 % - -   05/16/18 2130 151/78 - - - 62 19 100 % - -   05/16/18 2115 156/68 - - - 64 19 100 % - -   05/16/18 2100 162/73 - - - 58 17 100 % - -   05/16/18 2045 148/66 - - - - - - - -   05/16/18 2030 158/78 - - - 60 15 99 % - -   05/16/18 2015 147/78 - - - 60 11 100 % - -   05/16/18 1945 (!) 184/106 97.4  F (36.3  C) - 60 60 20 93 % 1.778 m (5' 10\") 83.9 kg (185 lb)         Physical Exam  General: Well-developed and well-nourished.  Elderly  man in mild - moderate respiratory distress. Cooperative.  Head:  Atraumatic.  Eyes:  Conjunctivae and sclerae are normal.  Clear/white appearing discharge/lacrimation from bilateral eyes without significant edema.  Mild lid erythema.  ENT:    Normal nose. Moist mucous membranes.  Neck:  Supple. Normal range of motion.  CV:  Regular rate and rhythm. Normal heart sounds with no murmurs, rubs, or gallops detected.  Resp:  Mild to moderate respiratory distress with tachypnea.  Significantly diminished breath sounds throughout with prolonged expiratory phase.  Scant end expiratory wheezing though diminished breath sounds predominate.  Pursed lip breathing.   GI:  Soft. Non-distended. Non-tender.    MS:  Normal ROM. 1-2+ bilateral pitting edema.   Skin:  Warm. Non-diaphoretic. No " pallor.  Neuro:  Awake. A&Ox3. Normal strength.  Psych: Normal mood and affect. Normal speech.  Vitals reviewed.    Emergency Department Course   EKG  Indication: shortness of breath   Time: 1947  Rate 60 bpm. QRS duration 166. QT/QTc 464/464.   Ventricular paced rhythm.   No acute ST changes.  No significant change as compared to prior, dated 7/5/16.    Imaging:  XR Chest PORT 1 view:  IMPRESSION: No acute abnormality.  Report per radiology.     Laboratory:  CBC:  WBC 12.6 (H), HGB 13.0 (L), ,  BMP: Glucose 107 (H), otherwise WNL (Creatinine 1.08)  Pro-BNP: 1559  (1955) Troponin I: <0.015  (2010) ISTAT gases lactate venous POCT: lactic acid 2.6 (H), pH 7.36, pCO2 61 (H), pO2 35, bicarbonate 35 (H), O2 saturation 62%  (2143) ISTAT gases lactate venous POCT: lactic acid 1.0, pH 7.39, pCO2 53 (H), pO2 24 (L), bicarbonate 32 (H), O2 saturation 42    Interventions:  (1955) Albuterol-Ipratropium inhalation solution, 2.5 mg-0.5 mg/3 ml; 3 mL, inhalation  (2006) Albuterol 2.5mg/3 mL inhalation solution, 2.5 mg, inhalation  (2032) Solu-Medrol, 62.5 mg, IV injection  (2032) Normal Saline, 1 liter, IV bolus    Emergency Department Course:  Nursing notes and vitals reviewed.  (1952) I performed an exam of the patient as documented above.    Blood was drawn from the patient. This was sent for laboratory testing, findings above.   EKG was done, interpretation as above.  The patient was sent for a x-ray while in the emergency department, findings above.     (2058) I revisited with the patient in their room to discuss results. Patient feeling much improved.    Findings and plan explained to the patient who consents to admission.   (2110) I discussed the patient with Dr. Villagomez of the hospitalist service, who will admit the patient to a medical bed for further monitoring, evaluation, and treatment.     Impression & Plan    Medical Decision Making:  Carlos Alberto Ortiz is a 78 year old male who presents with mild to moderate  respiratory distress.  He has a history of CHF and COPD on home oxygen.  He has noted increased shortness of breath over the last 3 days.  He recently returned from [place] and was unable to find his nebulizer so he did not have any treatment at home.  He is on Eliquis for atrial fibrillation making PE as etiology of his dyspnea highly unlikely.  Further, he does not endorse any chest pain.  He endorses nonproductive cough, rhinorrhea, and bilateral eye discharge/tearing.  Likely patient has a viral URI or seasonal allergies that is exacerbating his underlying COPD.  On exam he does have a bilateral eye discharge/increased lacrimation but most notable is his mild to moderate respiratory distress with tachypnea and pursed lip breathing.  He has significantly diminished breath sounds in all lung fields with scant end expiratory wheezing and a prolonged expiratory phase.  Patient was given a DuoNeb with symptomatic relief but no improvement in his symptoms.  He was then given an half hour-long breathing treatment and had notable improvement in his symptoms and his work of breathing.  At this time, he will not require BiPAP.  Initial lactic acid was elevated at 2.6 though I believe this is likely due to his work of breathing and not due to sepsis or infection as his chest x-ray is without evidence of pneumonia or other acute pathology.  BNP is normal at 1559 and patient does not note increased lower extremity edema.  This is unlikely to be a CHF exacerbation.  However, review of an echo from 2016 reveals an EF of less than 20%.  Thus, I am hesitant to give him significant fluids and will start an infusion of 100 cc/kg only without bolus.  Of note, on VBG patient's initial PCO2 was elevated at 61.  After the aforementioned treatments and continued monitoring in the emergency department, it improved to 53.  Lactic acid also improved to 1.  Troponin is negative and the remainder patient's labs are significant only for a  mild leukocytosis to 12.6.  Patient is on chronic prednisone and this in addition to his increased work of breathing and possible viral URI account for his leukocytosis.  EKG is reassuring without acute ST changes or arrhythmias.  He has a ventricular paced rhythm.  I empirically gave patient Claritin for possible seasonal allergies.  He was also given his first dose of Solu-Medrol.  Despite his marked improvement after the aforementioned interventions, I do believe patient warrants admission to the hospital for further monitoring and treatment.  He will continue to require frequent nebulizers and steroids.  I discussed this plan with the patient and his wife. They verbalized understanding and are amenable.  I discussed the patient's case with Dr. Villagomez, hospitalist, who accepts admission and has no further orders.    Diagnosis:    ICD-10-CM   1. COPD exacerbation (H) J44.1       Disposition:  Patient is admitted to a medical bed under the care of Dr. Villagomez.            I, Andrea Larry, am serving as a scribe on 5/16/2018 at 7:52 PM to personally document services performed by Dr. Carreon based on my observations and the provider's statements to me.       Andrea Larry  5/16/2018   [facility] EMERGENCY DEPARTMENT       Bev Carreon MD  05/17/18 0244

## 2018-05-17 NOTE — PROGRESS NOTES
[facility]  Hospitalist Progress Note    Refugio Mir MD 05/17/2018    Reason for Stay (Diagnosis): Acute on chronic hypoxic respiratory failure.         Assessment and Plan:      Summary of Stay: Carlos Alberto Ortiz is a 78 year old male with history of COPD on home oxygen, chronic systolic congestive heart failure, A. fib on anticoagulation, status post ICD, AV woody ablation hypertension admitted on 5/16/2018 with acute on chronic respiratory failure.    Problem List:   1. Acute on chronic hypoxic respiratory failure secondary to acute COPD exacerbation triggered by viral infection.  -Continue bronchodilators, steroids, oxygen.  -Monitor pulse ox  -VBG as needed if there is change in mental status or he becomes hypoxic  -Patient has home BiPAP, but did not use that here last night.  May use hospital BiPAP if needed at home setting  -RT following patient.    2. Chronic systolic congestive heart failure no acute exacerbation.  -Echo done previously as an outpatient at another facility showed EF of 25%, repeated Echo today showed ejection fraction of 45% which is significant improvement.  -There is no sign of CHF exacerbation, will resume his home dose of Lasix tomorrow, for today will will continue at 40 mg twice daily.    3. Arterial fibrillation post AV woody ablation.  -Continue Eliquis 5 mg twice daily  -Continue metoprolol 50 mg 2 times a day.    4. Hypertension  -Controlled on metoprolol, Procardia XL, ramipril.    5.   Benign prostatic hypertrophy-on Flomax, no issues with urination at this point    # Pain Assessment:  Current Pain Score 5/17/2018   Patient currently in pain? denies   Pain score (0-10) -   Carlos Alberto duke pain level was assessed and he currently denies pain.        DVT Prophylaxis: Patient is on Eliquis  Code Status: Full Code  Discharge Dispo: Home.  Estimated Disch Date / # of Days until Disch: 1-2 days if continue to improve.  I discussed with patient plan of care, all his  "question and concerns addressed.        Interval History (Subjective):      Patient seen and examined, feels a little better, no nausea or vomiting, shortness of breath improved, denied fever or chills.                  Physical Exam:      Last Vital Signs:  /58  Pulse 55  Temp 96.2  F (35.7  C) (Oral)  Resp 20  Ht 1.778 m (5' 10\")  Wt 82.5 kg (181 lb 12.8 oz)  SpO2 99%  BMI 26.09 kg/m2    I/O last 3 completed shifts:  In: 547 [P.O.:150; I.V.:397]  Out: -   Wt Readings from Last 1 Encounters:   05/17/18 82.5 kg (181 lb 12.8 oz)     Current Facility-Administered Medications   Medication     acetaminophen (TYLENOL) tablet 650 mg     albuterol neb solution 2.5 mg     albuterol neb solution 2.5 mg     ALPRAZolam (XANAX) tablet 0.5 mg     apixaban ANTICOAGULANT (ELIQUIS) tablet 5 mg     atorvastatin (LIPITOR) tablet 20 mg     citalopram (celeXA) tablet 10 mg     glucose gel 15-30 g    Or     dextrose 50 % injection 25-50 mL    Or     glucagon injection 1 mg     finasteride (PROSCAR) tablet 5 mg     fluticasone-vilanterol (BREO ELLIPTA) 200-25 MCG/INH oral inhaler 1 puff     furosemide (LASIX) tablet 40 mg     furosemide (LASIX) tablet 40 mg     insulin aspart (NovoLOG) inj (RAPID ACTING)     insulin aspart (NovoLOG) inj (RAPID ACTING)     lidocaine (LMX4) kit     lidocaine (LMX4) kit     lidocaine 1 % 1 mL     lidocaine 1 % 1 mL     melatonin tablet 1 mg     methylPREDNISolone sodium succinate (solu-MEDROL) injection 62.5 mg     metoprolol succinate (TOPROL-XL) 24 hr tablet 50 mg     montelukast (SINGULAIR) tablet 10 mg     naloxone (NARCAN) injection 0.1-0.4 mg     NIFEdipine ER osmotic (PROCARDIA XL) 24 hr tablet 60 mg     nitroGLYcerin (NITROSTAT) sublingual tablet 0.4 mg     ondansetron (ZOFRAN-ODT) ODT tab 4 mg    Or     ondansetron (ZOFRAN) injection 4 mg     Patient is already receiving anticoagulation with heparin, enoxaparin (LOVENOX), warfarin (COUMADIN)  or other anticoagulant medication     " polyethylene glycol (MIRALAX/GLYCOLAX) Packet 17 g     ramipril (ALTACE) capsule 10 mg     sodium chloride (PF) 0.9% PF flush 3 mL     sodium chloride (PF) 0.9% PF flush 3 mL     tamsulosin (FLOMAX) capsule 0.4 mg     umeclidinium (INCRUSE ELLIPTA) 62.5 MCG/INH oral inhaler 62.5 mcg       Constitutional: Awake, alert, cooperative, no apparent distress   Respiratory: Extensive bilateral wheezing, scattered crackles, prolonged expiratory phases.   Cardiovascular: Regular rate and rhythm, normal S1 and S2, and no murmur noted   Abdomen: Normal bowel sounds, soft, non-distended, non-tender   Skin: No rashes, no cyanosis, dry to touch   Neuro: Alert and oriented x3, no weakness, numbness, memory loss   Extremities: No edema, normal range of motion   Other(s):HEENT  Pink, non-icteric, moist oral mucosa.       All other systems: Negative          Medications:      All current medications were reviewed with changes reflected in problem list.         Data:      All new lab and imaging data was reviewed.   Labs:  No results for input(s): CULT in the last 168 hours.    Recent Labs  Lab 05/17/18  0605 05/16/18 1955    142   POTASSIUM 4.9 4.7   CHLORIDE 107 107   CO2 26 28   ANIONGAP 9 7   * 107*   BUN 22 18   CR 1.08 1.08   GFRESTIMATED 66 66   GFRESTBLACK 80 80   CHARITO 8.4* 9.0       Recent Labs  Lab 05/17/18  0605 05/16/18 1955   WBC 7.8 12.6*   HGB 11.7* 13.0*   HCT 37.1* 40.4   MCV 94 94   * 181       Recent Labs  Lab 05/17/18  0722 05/17/18  0605 05/17/18  0026 05/16/18 1955   GLC  --  135*  --  107*   *  --  112*  --       Imaging:   Results for orders placed or performed during the hospital encounter of 05/16/18   XR Chest Port 1 View    Narrative    CHEST PORTABLE ONE VIEW   5/16/2018 8:48 PM     HISTORY: Dyspnea, COPD.     COMPARISON: 7/5/2016.    FINDINGS: Upright portable chest. Left subclavian cardiac device in  place. No pneumothorax. The heart is enlarged. No pulmonary edema.  Thoracic  aorta is calcified. The lungs are clear.      Impression    IMPRESSION: No acute abnormality.    COLLEEN HAUSER MD

## 2018-05-17 NOTE — H&P
Admitted:     05/16/2018      PRIMARY CARE PHYSICIAN:  [facility].      ASSESSMENT AND PLAN:  This is a 78-year-old gentleman with a past medical history for COPD on home oxygen at 2.5 liters, chronic systolic CHF with last EF of about 25%-30% on echo in 06/2017, atrial fibrillation on anticoagulation, status post biventricular ICD, AV node ablation, and hypertension who comes to the hospital today with concerns for shortness of breath.  He just returned to [place] after an 8-month stay in [place] and was noticing shortness of breath, rhinorrhea, congestion, and cough even prior to his travel.  He has been unable to use his BiPAP and neb machine as it was packed for the last couple of days and finally came into the ER.  He was noticed to have tight lung sounds and given nebulizer with some improvement, and now being admitted to the hospital.     1.  Acute on chronic obstructive pulmonary disease exacerbation.  Suspect likely triggered by the viral infection.  At this time, patient does not have signs of any obvious bacterial infection.  His chest x-ray does not show any pneumonia.  I will treat him with IV steroids, Solu-Medrol 62 mg b.i.d., have him on scheduled nebulizers and resume his baseline inhalers.  I will check a procalcitonin.  Will otherwise hold on empiric antibiotics for the time being.   2.  Chronic systolic congestive heart failure with last EF of 25%-30% on echo done in 06/2017 at an outside health system.  The patient is status post ICD placement.  The patient states that he takes Lasix once a day, but takes an extra p.r.n. dose if needed, and maintains his weight between 180-185 pounds.  He feels that his leg swelling is pretty stable and does not feel that he is gaining extra fluid.  His proBNP is only 1500 and suspect that his shortness of breath and respiratory symptoms are more due to the COPD.  I will go ahead and check a repeat echocardiogram to evaluate his EF as it has  been a while since he had one.  Will resume on his Lasix orally and watch his daily weights.   3.  Atrial fibrillation, status post AV node ablation procedure.  We will resume the patient on Eliquis that he takes for anticoagulation and resume his baseline dose of metoprolol once the dose has been confirmed by the pharmacy.   4.  Elevated lactic acid of 2.6.  I suspect this is due to the patient's respiratory distress rather than sepsis.  We will closely monitor him clinically.  I will avoid aggressive resuscitation given his CHF.   5.  Sleep apnea.  The patient is on BiPAP at bedtime.  Again, he has been unable to use this for the last couple of days as he had packed it and has not opened it since coming back.  Will resume his BiPAP at bedtime.   6.  Prior history of bladder cancer.  According to the patient, he is in remission.   7.  Prior history of ESBL infection.      CODE STATUS:  Full CODE.       DEEP VENOUS THROMBOSIS PROPHYLAXIS: Eliquis.      DISPOSITION: The patient will be admitted as an inpatient to the hospital given a more than 2-midnight length anticipated stay in the hospital.  Plan of care has been discussed with him in great detail and all of his questions have been answered.  No family is present at bedside currently.      CHIEF COMPLAINT:  Shortness of breath.      HISTORY OF PRESENT ILLNESS:  History is obtained from the patient. Case was also discussed with the ER provider, Dr. Carreon.      The patient states that he stays in [place] for about 8 months of the year.  He left around August and just came back yesterday.  He states that even before his flight, he had been feeling more shortness of breath and sick for the last few days.  However, he was worried that going to the hospital would mess up his travel plans.  He has been having rhinorrhea, cough, watery eyes.  He feels like his allergies have really flared up after coming back to [place].  He also was having shortness of breath and  finally came in the ER.  It appears that he had very tight lung sounds on arrival to the ER and was given continuous nebs with finally some improvement, and lungs have started to open up.  The patient states that he has leg swelling, but that is stable and he feels that is actually doing quite well.  He denies having any chest pain, nausea, vomiting, fevers.  Denies any sick contacts.  No other complaints or concerns are voiced.      PHYSICAL EXAMINATION:   VITAL SIGNS:  Blood pressure is 147/78, heart rate is 62, respiration rate is 20.  Oxygen saturation 98% on about 3 liters oxygen.  Temperature 97.4.   GENERAL:  Elderly male lying in the bed in no distress, pleasant and cooperative.   HEENT:  Normocephalic, atraumatic. Mucosa is moist.   NECK:  Supple.   CARDIOVASCULAR:  He has regular rate and rhythm, normal S1, S2, early systolic murmur.   LUNGS:  Diminished air entry bilaterally with expiratory wheezing.  No significant crackles.   ABDOMEN:  Soft, nontender, nondistended, positive bowel sounds, no rebound or guarding.   EXTREMITIES:  Warm and dry.  There is about trace to +1 edema in extremities.   NEUROLOGIC:  He is awake, alert, oriented x 3.  Good strength in all extremities.  No gross focal deficit.   SKIN:  Warm and dry, no obvious rashes.  No cyanosis, no clubbing.   PSYCHIATRIC:  Pleasant, cooperative and appropriate affect.      LABORATORY DATA AND IMAGING:  Laboratory data and imaging within the last 24 hours have been reviewed.  EKG was reviewed and shows a paced rhythm.         Past Medical History    I have reviewed this patient's medical history and updated it with pertinent information if needed.   Past Medical History:   Diagnosis Date     Atrial fibrillation (H) 7/1/2016     Biventricular ICD (implantable cardioverter-defibrillator) in place 7/1/2016     Cancer (H)      Cardiomyopathy, unspecified 7/1/2016     CHF (congestive heart failure) (H) 6/27/2016     COPD (chronic obstructive pulmonary  disease) (H)      Coronary artery disease      Hx of atrioventricular node ablation 7/1/2016     Hypertension      Uncomplicated asthma        Past Surgical History   I have reviewed this patient's surgical history and updated it with pertinent information if needed.  Past Surgical History:   Procedure Laterality Date     CARDIAC SURGERY       ORTHOPEDIC SURGERY       VASCULAR SURGERY         Prior to Admission Medications   Prior to Admission Medications   Prescriptions Last Dose Informant Patient Reported? Taking?   ALPRAZolam (XANAX) 0.5 MG tablet 5/16/2018 at am  Yes Yes   Sig: Take 0.5 mg by mouth 2 times daily    NIFEdipine ER (ADALAT CC) 60 MG 24 hr tablet 5/15/2018 at pm  Yes Yes   Sig: Take 60 mg by mouth every evening   albuterol (2.5 MG/3ML) 0.083% nebulizer solution 5/15/2018 at am  No Yes   Sig: Take 1 vial (2.5 mg) by nebulization 4 times daily   albuterol (PROAIR HFA/PROVENTIL HFA/VENTOLIN HFA) 108 (90 Base) MCG/ACT Inhaler Unknown at Unknown time  Yes No   Sig: Inhale 1-2 puffs into the lungs every 6 hours as needed for shortness of breath / dyspnea or wheezing   apixaban ANTICOAGULANT (ELIQUIS) 5 MG tablet 5/16/2018 at am  Yes Yes   Sig: Take 5 mg by mouth 2 times daily   atorvastatin (LIPITOR) 20 MG tablet 5/16/2018 at am  Yes Yes   Sig: Take 20 mg by mouth daily   citalopram (CELEXA) 10 MG tablet 5/15/2018 at pm  Yes Yes   Sig: Take 10 mg by mouth every evening    finasteride (PROSCAR) 5 MG tablet 5/16/2018 at am  Yes Yes   Sig: Take 5 mg by mouth daily   fluticasone-salmeterol (ADVAIR DISKUS) 250-50 MCG/DOSE diskus inhaler 5/16/2018 at am  Yes Yes   Sig: Inhale 1 puff into the lungs 2 times daily   furosemide (LASIX) 40 MG tablet 5/16/2018 at am  Yes Yes   Sig: Take 40 mg by mouth daily   furosemide (LASIX) 40 MG tablet Unknown at Unknown time  Yes No   Sig: Take 40 mg by mouth daily as needed   metFORMIN (GLUCOPHAGE-XR) 500 MG 24 hr tablet 5/15/2018 at pm  Yes Yes   Sig: Take 500 mg by mouth  daily (with dinner)   metoprolol succinate (TOPROL-XL) 50 MG 24 hr tablet 5/16/2018 at am  Yes Yes   Sig: Take 50 mg by mouth 2 times daily   montelukast (SINGULAIR) 10 MG tablet 5/15/2018 at pm  Yes Yes   Sig: Take 10 mg by mouth At Bedtime   potassium chloride SA (K-DUR/KLOR-CON M) 10 MEQ CR tablet 5/16/2018 at am  Yes Yes   Sig: Take 10 mEq by mouth daily   predniSONE (DELTASONE) 10 MG tablet 5/16/2018 at am  Yes Yes   Sig: Take 10 mg by mouth 2 times daily   ramipril (ALTACE) 10 MG capsule 5/15/2018 at pm  Yes Yes   Sig: Take 10 mg by mouth every evening    tamsulosin (FLOMAX) 0.4 MG 24 hr capsule 5/15/2018 at pm  Yes Yes   Sig: Take 0.4 mg by mouth every evening    tiotropium (SPIRIVA) 18 MCG inhalation capsule 5/16/2018 at am  Yes Yes   Sig: Inhale 18 mcg into the lungs daily      Facility-Administered Medications: None     Allergies   No Known Allergies    Social History   I have reviewed this patient's social history and updated it with pertinent information if needed.   Social History     Social History     Marital status:      Spouse name: N/A     Number of children: N/A     Years of education: N/A     Occupational History     Not on file.     Social History Main Topics     Smoking status: Former Smoker     Quit date: 1/1/2002     Smokeless tobacco: Never Used     Alcohol use No     Drug use: No     Sexual activity: Not on file     Other Topics Concern     Caffeine Concern No     3 cups daily     Special Diet No     lower sodium     Exercise No     Social History Narrative       Family History   I have reviewed this patient's family history and updated it with pertinent information if needed.   Family History   Problem Relation Age of Onset     DIABETES Mother      Hypertension Mother      Venous Disease Mother      Hypertension Sister      Coronary Artery Disease Sister      Venous Disease Sister      Other Cancer Father      bone cancer     CANCER Father      Venous Disease Father        Review of  Systems   The 10 point Review of Systems is negative other than noted in the HPI or here.     Physical Exam       Data   Data reviewed today:  I personally reviewed     Lab data and imaging results from today have been reviewed.               WATSON ECHOLS MD             D: 2018   T: 2018   MT:       Name:     QIAN OLIVO   MRN:      [id number]        Account:      [id number]   :      1940        Admitted:     2018                   Document: [id number]

## 2018-05-18 LAB
GLUCOSE BLDC GLUCOMTR-MCNC: 132 MG/DL (ref 70–99)
GLUCOSE BLDC GLUCOMTR-MCNC: 147 MG/DL (ref 70–99)
GLUCOSE BLDC GLUCOMTR-MCNC: 157 MG/DL (ref 70–99)
GLUCOSE BLDC GLUCOMTR-MCNC: 184 MG/DL (ref 70–99)
GLUCOSE BLDC GLUCOMTR-MCNC: 200 MG/DL (ref 70–99)

## 2018-05-18 PROCEDURE — 00000146 ZZHCL STATISTIC GLUCOSE BY METER IP

## 2018-05-18 PROCEDURE — A9270 NON-COVERED ITEM OR SERVICE: HCPCS | Mod: GY | Performed by: INTERNAL MEDICINE

## 2018-05-18 PROCEDURE — 25000125 ZZHC RX 250: Performed by: INTERNAL MEDICINE

## 2018-05-18 PROCEDURE — 25000128 H RX IP 250 OP 636: Performed by: INTERNAL MEDICINE

## 2018-05-18 PROCEDURE — 25000132 ZZH RX MED GY IP 250 OP 250 PS 637: Mod: GY | Performed by: INTERNAL MEDICINE

## 2018-05-18 PROCEDURE — 40000275 ZZH STATISTIC RCP TIME EA 10 MIN

## 2018-05-18 PROCEDURE — 25000131 ZZH RX MED GY IP 250 OP 636 PS 637: Mod: GY | Performed by: INTERNAL MEDICINE

## 2018-05-18 PROCEDURE — 94640 AIRWAY INHALATION TREATMENT: CPT | Mod: 76

## 2018-05-18 PROCEDURE — 12000000 ZZH R&B MED SURG/OB

## 2018-05-18 PROCEDURE — 99232 SBSQ HOSP IP/OBS MODERATE 35: CPT | Performed by: INTERNAL MEDICINE

## 2018-05-18 PROCEDURE — 94640 AIRWAY INHALATION TREATMENT: CPT

## 2018-05-18 RX ORDER — FUROSEMIDE 40 MG
40 TABLET ORAL DAILY
Status: DISCONTINUED | OUTPATIENT
Start: 2018-05-19 | End: 2018-05-19 | Stop reason: HOSPADM

## 2018-05-18 RX ADMIN — APIXABAN 5 MG: 5 TABLET, FILM COATED ORAL at 21:07

## 2018-05-18 RX ADMIN — INSULIN ASPART 1 UNITS: 100 INJECTION, SOLUTION INTRAVENOUS; SUBCUTANEOUS at 21:21

## 2018-05-18 RX ADMIN — ATORVASTATIN CALCIUM 20 MG: 20 TABLET, FILM COATED ORAL at 09:32

## 2018-05-18 RX ADMIN — FINASTERIDE 5 MG: 5 TABLET, FILM COATED ORAL at 09:32

## 2018-05-18 RX ADMIN — ALPRAZOLAM 0.5 MG: 0.5 TABLET ORAL at 21:06

## 2018-05-18 RX ADMIN — METOPROLOL SUCCINATE 50 MG: 50 TABLET, EXTENDED RELEASE ORAL at 09:32

## 2018-05-18 RX ADMIN — METHYLPREDNISOLONE SODIUM SUCCINATE 62.5 MG: 125 INJECTION, POWDER, FOR SOLUTION INTRAMUSCULAR; INTRAVENOUS at 21:06

## 2018-05-18 RX ADMIN — CITALOPRAM HYDROBROMIDE 10 MG: 10 TABLET ORAL at 19:35

## 2018-05-18 RX ADMIN — UMECLIDINIUM 62.5 MCG: 62.5 AEROSOL, POWDER ORAL at 07:46

## 2018-05-18 RX ADMIN — ALPRAZOLAM 0.5 MG: 0.5 TABLET ORAL at 09:32

## 2018-05-18 RX ADMIN — METOPROLOL SUCCINATE 50 MG: 50 TABLET, EXTENDED RELEASE ORAL at 21:07

## 2018-05-18 RX ADMIN — APIXABAN 5 MG: 5 TABLET, FILM COATED ORAL at 09:32

## 2018-05-18 RX ADMIN — NIFEDIPINE 60 MG: 30 TABLET, FILM COATED, EXTENDED RELEASE ORAL at 19:35

## 2018-05-18 RX ADMIN — ALBUTEROL SULFATE 2.5 MG: 2.5 SOLUTION RESPIRATORY (INHALATION) at 12:18

## 2018-05-18 RX ADMIN — FUROSEMIDE 40 MG: 40 TABLET ORAL at 09:32

## 2018-05-18 RX ADMIN — METHYLPREDNISOLONE SODIUM SUCCINATE 62.5 MG: 125 INJECTION, POWDER, FOR SOLUTION INTRAMUSCULAR; INTRAVENOUS at 09:28

## 2018-05-18 RX ADMIN — ALBUTEROL SULFATE 2.5 MG: 2.5 SOLUTION RESPIRATORY (INHALATION) at 16:16

## 2018-05-18 RX ADMIN — ALBUTEROL SULFATE 2.5 MG: 2.5 SOLUTION RESPIRATORY (INHALATION) at 20:37

## 2018-05-18 RX ADMIN — RAMIPRIL 10 MG: 5 CAPSULE ORAL at 19:36

## 2018-05-18 RX ADMIN — FLUTICASONE FUROATE AND VILANTEROL TRIFENATATE 1 PUFF: 200; 25 POWDER RESPIRATORY (INHALATION) at 07:46

## 2018-05-18 RX ADMIN — MONTELUKAST SODIUM 10 MG: 10 TABLET, FILM COATED ORAL at 21:21

## 2018-05-18 RX ADMIN — ALBUTEROL SULFATE 2.5 MG: 2.5 SOLUTION RESPIRATORY (INHALATION) at 07:46

## 2018-05-18 RX ADMIN — TAMSULOSIN HYDROCHLORIDE 0.4 MG: 0.4 CAPSULE ORAL at 19:36

## 2018-05-18 ASSESSMENT — ACTIVITIES OF DAILY LIVING (ADL)
ADLS_ACUITY_SCORE: 10

## 2018-05-18 NOTE — PROVIDER NOTIFICATION
Admission pager texted: Tele tech called to report that patient has been running in the 30s for a heart rate. He is normally 100 percent paced but monitor is picking up PVCs with bigeminy so rate is slower. He also has Procardia and Ramipril due now. Please advise if you want me to hold these and any other intervention needed

## 2018-05-18 NOTE — PROVIDER NOTIFICATION
Admission pager notified: Per tele tech pt is in paced rhythm with less frequent PVCs. Heart rate at 60. Vitals have remained stable and pt asymptomatic. His most recent vitals are 130/56 with pulse of 60. Do you want me giving the Metoprolol that is due now?

## 2018-05-18 NOTE — PROGRESS NOTES
[facility]  Hospitalist Progress Note    Refugio Mir MD 05/18/2018    Reason for Stay (Diagnosis): Acute on chronic hypoxic respiratory failure.         Assessment and Plan:      Summary of Stay: Carlos Alberto Ortiz is a 78 year old male with history of COPD on home oxygen, chronic systolic congestive heart failure, A. fib on anticoagulation, status post ICD, AV woody ablation hypertension admitted on 5/16/2018 with acute on chronic respiratory failure.    Problem List:   1. Acute on chronic hypoxic respiratory failure secondary to acute COPD exacerbation triggered by viral infection.  -Continue bronchodilators, steroids, oxygen.  Will change to prednisone tomorrow, continue Solu-Medrol for now.  -Monitor pulse ox  -VBG as needed if there is change in mental status or he becomes hypoxic  -Patient has home BiPAP, but did not use that here last night.  May use hospital BiPAP if needed at home setting  -No antibiotic for now  -RT following patient, he will need BiPAP at home setting tonight.  -Patient has a history of smoking quit about 15 years ago    2. Chronic systolic congestive heart failure no acute exacerbation.  -Echo done previously as an outpatient at another facility showed EF of 25%.  - Repeated Echo this admission showed ejection fraction of 45% which is significant improvement.  I discussed the result with the patient.  -There is no sign of CHF exacerbation, will resume his home dose of Lasix 40 mg p.o. daily starting tomorrow.    3. Arterial fibrillation post AV woody ablation.  -Continue Eliquis 5 mg twice daily.  -Continue metoprolol 50 mg 2 times a day.    4. Hypertension  -Controlled on metoprolol, Procardia XL, ramipril.    5.   Benign prostatic hypertrophy-on Flomax, no issues with urination at this point    # Pain Assessment:  Current Pain Score 5/18/2018   Patient currently in pain? denies   Pain score (0-10) -   Carlos Alberto duke pain level was assessed and he currently denies pain.   "      DVT Prophylaxis: Patient is on Eliquis  Code Status: Full Code  Discharge Dispo: Home.  Estimated Disch Date / # of Days until Disch: 1 day if continue to improve.  I discussed with patient plan of care, all his question and concerns addressed.        Interval History (Subjective):      Patient seen and examined, claims he did not sleep well last night, there was intermittent shortness of breath last night, felt weak this morning, no nausea or vomiting, shortness of breath improved this morning, denied fever or chills.                  Physical Exam:      Last Vital Signs:  /75 (BP Location: Right arm)  Pulse 66  Temp 96.1  F (35.6  C) (Oral)  Resp 20  Ht 1.778 m (5' 10\")  Wt 82.1 kg (181 lb)  SpO2 98%  BMI 25.97 kg/m2    I/O last 3 completed shifts:  In: 1215 [P.O.:1215]  Out: 2700 [Urine:2700]  Wt Readings from Last 1 Encounters:   05/18/18 82.1 kg (181 lb)     Current Facility-Administered Medications   Medication     acetaminophen (TYLENOL) tablet 650 mg     albuterol neb solution 2.5 mg     albuterol neb solution 2.5 mg     ALPRAZolam (XANAX) tablet 0.5 mg     apixaban ANTICOAGULANT (ELIQUIS) tablet 5 mg     atorvastatin (LIPITOR) tablet 20 mg     citalopram (celeXA) tablet 10 mg     glucose gel 15-30 g    Or     dextrose 50 % injection 25-50 mL    Or     glucagon injection 1 mg     finasteride (PROSCAR) tablet 5 mg     fluticasone-vilanterol (BREO ELLIPTA) 200-25 MCG/INH oral inhaler 1 puff     insulin aspart (NovoLOG) inj (RAPID ACTING)     insulin aspart (NovoLOG) inj (RAPID ACTING)     lidocaine (LMX4) kit     lidocaine (LMX4) kit     lidocaine 1 % 1 mL     lidocaine 1 % 1 mL     melatonin tablet 1 mg     methylPREDNISolone sodium succinate (solu-MEDROL) injection 62.5 mg     metoprolol succinate (TOPROL-XL) 24 hr tablet 50 mg     montelukast (SINGULAIR) tablet 10 mg     naloxone (NARCAN) injection 0.1-0.4 mg     NIFEdipine ER osmotic (PROCARDIA XL) 24 hr tablet 60 mg     nitroGLYcerin " (NITROSTAT) sublingual tablet 0.4 mg     ondansetron (ZOFRAN-ODT) ODT tab 4 mg    Or     ondansetron (ZOFRAN) injection 4 mg     Patient is already receiving anticoagulation with heparin, enoxaparin (LOVENOX), warfarin (COUMADIN)  or other anticoagulant medication     polyethylene glycol (MIRALAX/GLYCOLAX) Packet 17 g     ramipril (ALTACE) capsule 10 mg     sodium chloride (PF) 0.9% PF flush 3 mL     sodium chloride (PF) 0.9% PF flush 3 mL     tamsulosin (FLOMAX) capsule 0.4 mg     umeclidinium (INCRUSE ELLIPTA) 62.5 MCG/INH oral inhaler 62.5 mcg       Constitutional: Awake, alert, cooperative, no apparent distress   Respiratory: Extensive bilateral wheezing, scattered crackles, prolonged expiratory phases.   Cardiovascular: Regular rate and rhythm, normal S1 and S2, and no murmur noted   Abdomen: Normal bowel sounds, soft, non-distended, non-tender   Skin: No rashes, no cyanosis, dry to touch   Neuro: Alert and oriented x3, no weakness, numbness, memory loss   Extremities: No edema, normal range of motion   Other(s):HEENT  Pink, non-icteric, moist oral mucosa.       All other systems: Negative          Medications:      All current medications were reviewed with changes reflected in problem list.         Data:      All new lab and imaging data was reviewed.   Labs:  No results for input(s): CULT in the last 168 hours.    Recent Labs  Lab 05/17/18  0605 05/16/18 1955    142   POTASSIUM 4.9 4.7   CHLORIDE 107 107   CO2 26 28   ANIONGAP 9 7   * 107*   BUN 22 18   CR 1.08 1.08   GFRESTIMATED 66 66   GFRESTBLACK 80 80   CHARITO 8.4* 9.0       Recent Labs  Lab 05/17/18  0605 05/16/18 1955   WBC 7.8 12.6*   HGB 11.7* 13.0*   HCT 37.1* 40.4   MCV 94 94   * 181       Recent Labs  Lab 05/18/18  1231 05/18/18  0740 05/18/18  0155 05/17/18  2108 05/17/18  1714  05/17/18  0605  05/16/18 1955   GLC  --   --   --   --   --   --  135*  --  107*   * 157* 184* 183* 207*  < >  --   < >  --    < > = values in  this interval not displayed.   Imaging:   Results for orders placed or performed during the hospital encounter of 05/16/18   XR Chest Port 1 View    Narrative    CHEST PORTABLE ONE VIEW   5/16/2018 8:48 PM     HISTORY: Dyspnea, COPD.     COMPARISON: 7/5/2016.    FINDINGS: Upright portable chest. Left subclavian cardiac device in  place. No pneumothorax. The heart is enlarged. No pulmonary edema.  Thoracic aorta is calcified. The lungs are clear.      Impression    IMPRESSION: No acute abnormality.    COLLEEN HAUSER MD

## 2018-05-18 NOTE — PLAN OF CARE
Problem: Patient Care Overview  Goal: Plan of Care/Patient Progress Review  Outcome: No Change  Ambulatory Status:  Pt up ind.  Orientation: a/o  VS:  VSS, on 2.5L O2 (baseline)   Tele: paced with intermittent PVC's   Pain:  denies  Resp: LS exp wheezing. Cough infreq prod.   GI:  denies nausea.  good appetite, on Mod carb, 2gm Na  diet. +BS.  Passing flatus.  Last BM 5/17.  :  Voiding without difficulty   Tx: IV solumedrol and scheduled nebs   Labs:   and 132  Disposition:  tbd

## 2018-05-18 NOTE — CONSULTS
Care Transition Initial Assessment - RN    Reason For Consult: care coordination/care conference, discharge planning   Met with: Patient.  DATA   Active Problems:    COPD exacerbation (H)       Cognitive Status: awake, alert and oriented.  Primary Care Clinic Name: [facility]  Primary Care MD Name: Bobo Damico  Contact information and PCP information verified: Yes  Lives With: spouse  Living Arrangements: mobile home  Quality Of Family Relationships: supportive, involved      Who is your support system?: Wife       Insurance concerns: No Insurance issues identified  ASSESSMENT  Patient currently receives the following services:  none        Identified issues/concerns regarding health management: Pt admitted with copd exacerbation. Pt states he had been sick for about 5 days but had plane tickets to [place] and it would have been $300 to change his flight. Pt is fully aware he should have have gone into the doctors office. He lives in [place] for 8 months and [place] for 3 months in the summer. He has  PCP, lung, and heart doctor in [place] that he follows with regularly and see's Dr. Damico at [facility] when he is in [place]. He uses 2.5 liters of oxygen at baseline supplied through [facility] [facility] in [place]. He uses inhalers and nebs to help manage his COPD. He also weighs himself and follows a low sodium diet with his CHF. COPD action plan was reviewed and given to the pt.   Pt agreed to hospital follow up appointment at [facility].  PLAN  Patient anticipates discharging to home .        Patient anticipates needs for home equipment: No  Plan/Disposition: Home   Appointments: Dr. Damico [facility] Thursday 5/24/18 at 1pm.      Care  (CTS) will continue to follow as needed.    Erlinda Rodriguez RN, BSN CTS  Care Coordinator  [phone]

## 2018-05-18 NOTE — PLAN OF CARE
Problem: Patient Care Overview  Goal: Plan of Care/Patient Progress Review  Lung sounds diminished. Pt on his baseline home dose of  2.5L oxygen. He reports some HORAN. Afebrile. Up independent in room.

## 2018-05-18 NOTE — PROGRESS NOTES
Discharge Planner   Discharge Plans in progress: home with spouse, continued use of home oxygen  Barriers to discharge plan: improved breathing  Follow up plan: PCP appointment scheduled 5/24 at 1pm with Dr. Damico of [facility].       Entered by: Gerladine Gambucci 05/18/2018 1:18 PM

## 2018-05-19 VITALS
TEMPERATURE: 96.5 F | HEART RATE: 66 BPM | BODY MASS INDEX: 25.45 KG/M2 | WEIGHT: 177.8 LBS | RESPIRATION RATE: 18 BRPM | DIASTOLIC BLOOD PRESSURE: 74 MMHG | SYSTOLIC BLOOD PRESSURE: 140 MMHG | HEIGHT: 70 IN | OXYGEN SATURATION: 98 %

## 2018-05-19 LAB
GLUCOSE BLDC GLUCOMTR-MCNC: 146 MG/DL (ref 70–99)
GLUCOSE BLDC GLUCOMTR-MCNC: 159 MG/DL (ref 70–99)
GLUCOSE BLDC GLUCOMTR-MCNC: 167 MG/DL (ref 70–99)

## 2018-05-19 PROCEDURE — A9270 NON-COVERED ITEM OR SERVICE: HCPCS | Mod: GY | Performed by: INTERNAL MEDICINE

## 2018-05-19 PROCEDURE — 00000146 ZZHCL STATISTIC GLUCOSE BY METER IP

## 2018-05-19 PROCEDURE — 94660 CPAP INITIATION&MGMT: CPT

## 2018-05-19 PROCEDURE — 40000275 ZZH STATISTIC RCP TIME EA 10 MIN

## 2018-05-19 PROCEDURE — 25000132 ZZH RX MED GY IP 250 OP 250 PS 637: Mod: GY | Performed by: INTERNAL MEDICINE

## 2018-05-19 PROCEDURE — 25000125 ZZHC RX 250: Performed by: INTERNAL MEDICINE

## 2018-05-19 PROCEDURE — 94640 AIRWAY INHALATION TREATMENT: CPT | Mod: 76

## 2018-05-19 PROCEDURE — 94640 AIRWAY INHALATION TREATMENT: CPT

## 2018-05-19 PROCEDURE — 99239 HOSP IP/OBS DSCHRG MGMT >30: CPT | Performed by: INTERNAL MEDICINE

## 2018-05-19 PROCEDURE — 40000809 ZZH STATISTIC NO DOCUMENTATION TO SUPPORT CHARGE

## 2018-05-19 PROCEDURE — 25000128 H RX IP 250 OP 636: Performed by: INTERNAL MEDICINE

## 2018-05-19 RX ORDER — PREDNISONE 10 MG/1
TABLET ORAL
Qty: 30 TABLET | Refills: 0 | Status: SHIPPED | OUTPATIENT
Start: 2018-05-19

## 2018-05-19 RX ADMIN — ALPRAZOLAM 0.5 MG: 0.5 TABLET ORAL at 08:35

## 2018-05-19 RX ADMIN — FUROSEMIDE 40 MG: 40 TABLET ORAL at 08:35

## 2018-05-19 RX ADMIN — APIXABAN 5 MG: 5 TABLET, FILM COATED ORAL at 08:35

## 2018-05-19 RX ADMIN — UMECLIDINIUM 62.5 MCG: 62.5 AEROSOL, POWDER ORAL at 08:12

## 2018-05-19 RX ADMIN — ALBUTEROL SULFATE 2.5 MG: 2.5 SOLUTION RESPIRATORY (INHALATION) at 15:20

## 2018-05-19 RX ADMIN — METOPROLOL SUCCINATE 50 MG: 50 TABLET, EXTENDED RELEASE ORAL at 08:35

## 2018-05-19 RX ADMIN — ATORVASTATIN CALCIUM 20 MG: 20 TABLET, FILM COATED ORAL at 08:35

## 2018-05-19 RX ADMIN — METHYLPREDNISOLONE SODIUM SUCCINATE 62.5 MG: 125 INJECTION, POWDER, FOR SOLUTION INTRAMUSCULAR; INTRAVENOUS at 09:13

## 2018-05-19 RX ADMIN — ALBUTEROL SULFATE 2.5 MG: 2.5 SOLUTION RESPIRATORY (INHALATION) at 11:27

## 2018-05-19 RX ADMIN — FLUTICASONE FUROATE AND VILANTEROL TRIFENATATE 1 PUFF: 200; 25 POWDER RESPIRATORY (INHALATION) at 08:12

## 2018-05-19 RX ADMIN — FINASTERIDE 5 MG: 5 TABLET, FILM COATED ORAL at 08:35

## 2018-05-19 RX ADMIN — ALBUTEROL SULFATE 2.5 MG: 2.5 SOLUTION RESPIRATORY (INHALATION) at 08:12

## 2018-05-19 RX ADMIN — ALBUTEROL SULFATE 2.5 MG: 2.5 SOLUTION RESPIRATORY (INHALATION) at 01:55

## 2018-05-19 ASSESSMENT — ACTIVITIES OF DAILY LIVING (ADL)
ADLS_ACUITY_SCORE: 10

## 2018-05-19 NOTE — PLAN OF CARE
Problem: Diabetes Comorbidity  Goal: Diabetes  Patient comorbidity will be monitored for signs and symptoms of hyperglycemia or hypoglycemia. Problems will be absent, minimized or managed by discharge/transition of care.   Outcome: Adequate for Discharge Date Met: 05/19/18  Discharged to home. Wife provided transportation. AVS reviewed, new medication regimen reviewed. 30 tabs of prednisone given to pt. Pt had no further questions. IV removed CDI. Belongings returned to pt. Pt oxygen sent home with him. W/c transport to exit.

## 2018-05-19 NOTE — PLAN OF CARE
Problem: Patient Care Overview  Goal: Plan of Care/Patient Progress Review  Outcome: No Change  Pt up independently. Walks frequently. Denies pain. VSS. On 2.5L O2 via nasal cannula (baseline). C/o shortness of breath. LS exp wheezes. IS encouraged. &200. Tele tech reported 2.4 pause, MD paged. New IV placed. Bipap on for sleep. 2gm NA & MOD CHO diet, good appetite. Receiving scheduled nebs and solu-medrol.D/C TBD.

## 2018-05-19 NOTE — PROGRESS NOTES
Transition Communication Hand-off for Care Transitions to Next Level of Care Provider    Name: Carlos Alberto Ortiz  : 1940  MRN #: [id number]  Primary Care Provider: CRISTOPHER HOLDEN  Primary Care MD Name: Cristopher Holden  Primary Clinic: [facility] [address]  [facility] [address]  Primary Care Clinic Name: [facility]  Reason for Hospitalization:  COPD exacerbation (H) [J44.1]  Admit Date/Time: 2018  7:43 PM  Discharge Date: 18  Payor Source: Payor: MEDICARE / Plan: MEDICARE / Product Type: Medicare /     Readmission Assessment Measure (OSMEL) Risk Score/category: Average           Reason for Communication Hand-off Referral: Admission diagnoses: COPD    Discharge Plan: home       Concern for non-adherence with plan of care:   Y/N n  Discharge Needs Assessment:  Needs       Most Recent Value    Equipment Currently Used at Home grab bar [has shower chair]    Transportation Available car, family or friend will provide    # of Referrals Placed by CTS Scheduled Follow-up appointments          Follow-up specialty is recommended: No    Follow-up plan:  No future appointments.    Any outstanding tests or procedures:  none        Key Recommendations:  COPD action plan reviewed and given to pt. Pt lives in [place] for 8 months out of the year and the summer months in [place] to watch is grandkids play ball. Pt needs reminders/encouragement to seek medical help right away when he is sick to help prevent another hospital admission with his COPD.    Erlinda Rodriguez    AVS/Discharge Summary is the source of truth; this is a helpful guide for improved communication of patient story

## 2018-05-19 NOTE — PLAN OF CARE
Problem: Patient Care Overview  Goal: Plan of Care/Patient Progress Review  Outcome: Improving  Ambulatory Status:  Pt up Ind.  VS:  VSS continues on 2-2.5L O2 which is baseline  Pain:  Denies  Resp: LS Dim  GI:  denies nausea.  fair appetite and on Mod Cho & 2 G NA diet.  BS +.  Passing flatus.  Last BM 5/17/2018.  :  Voiding adequately  Skin:  intact  Tx:  Solu-medrol, transitioning to PO  Disposition:  Discharge this bonita    Will continue to monitor and provide supportive cares.

## 2018-05-19 NOTE — PLAN OF CARE
Problem: Patient Care Overview  Goal: Plan of Care/Patient Progress Review  Orientation: A/O x4, calls as needed  VS stable, afebrile, denies pain    LS: exp wheezes / diminished = PRN neb, bipap removed after respiratory consult per pt request since intermittent beeping wouldn't stop.  GI: + Flatus - BM. Denies N/V. BS hyperactive  : Adequate urine output.  Diet: Mod Carb / 2 gram Na+  PIV: Saline Locked  Activity: Independent with mobility. Pt slept comfortably throughout shift.   BGs: 159  Disposition: Expected discharge today per MD after switch to prednisone from IVP solumedrol.

## 2018-05-19 NOTE — PROGRESS NOTES
RT Note:  Pt placed on our Bipap 10/5 and 30%, Sats 94 %. On full face mask, medium. Pt tolerated well.

## 2018-05-19 NOTE — DISCHARGE SUMMARY
Admit Date:     05/16/2018   Discharge Date:     05/19/2018      PRIMARY CARE PROVIDER:  Bobo Damico MD.      DISCHARGE DIAGNOSES:   1.  Acute on chronic hypoxic respiratory failure secondary to chronic obstructive pulmonary disease exacerbation.   2.  Acute chronic obstructive pulmonary disease exacerbation secondary to viral infection.   3.  Chronic systolic congestive heart failure, no exacerbation.   4.  History of atrial fibrillation post-AV woody ablation.   5.  Hypertension.   6.  Benign prostatic hypertrophy.      DISPOSITION:  Home.      DISCHARGE MEDICATIONS:       Review of your medicines      CONTINUE these medicines which may have CHANGED, or have new prescriptions. If we are uncertain of the size of tablets/capsules you have at home, strength may be listed as something that might have changed.       Dose / Directions    LASIX 40 MG tablet   This may have changed:  Another medication with the same name was removed. Continue taking this medication, and follow the directions you see here.   Generic drug:  furosemide        Dose:  40 mg   Take 40 mg by mouth daily   Refills:  0       * predniSONE 10 MG tablet   Commonly known as:  DELTASONE   This may have changed:  Another medication with the same name was added. Make sure you understand how and when to take each.        Dose:  10 mg   Take 10 mg by mouth 2 times daily   Refills:  0       * predniSONE 10 MG tablet   Commonly known as:  DELTASONE   This may have changed:  You were already taking a medication with the same name, and this prescription was added. Make sure you understand how and when to take each.   Used for:  COPD exacerbation (H)        6 tabs daily for 3 days, then 4 tabs daily for 3 days, then start your own home previous dose.   Quantity:  30 tablet   Refills:  0       * Notice:  This list has 2 medication(s) that are the same as other medications prescribed for you. Read the directions carefully, and ask your doctor or other care  provider to review them with you.      CONTINUE these medicines which have NOT CHANGED       Dose / Directions    ADVAIR DISKUS 250-50 MCG/DOSE diskus inhaler   Generic drug:  fluticasone-salmeterol        Dose:  1 puff   Inhale 1 puff into the lungs 2 times daily   Refills:  0       * albuterol 108 (90 Base) MCG/ACT Inhaler   Commonly known as:  PROAIR HFA/PROVENTIL HFA/VENTOLIN HFA        Dose:  1-2 puff   Inhale 1-2 puffs into the lungs every 6 hours as needed for shortness of breath / dyspnea or wheezing   Refills:  0       * albuterol (2.5 MG/3ML) 0.083% neb solution   Used for:  COPD exacerbation (H), Acute on chronic systolic congestive heart failure (H)        Dose:  1 vial   Take 1 vial (2.5 mg) by nebulization 4 times daily   Quantity:  360 mL   Refills:  3       ALPRAZolam 0.5 MG tablet   Commonly known as:  XANAX        Dose:  0.5 mg   Take 0.5 mg by mouth 2 times daily   Refills:  0       citalopram 10 MG tablet   Commonly known as:  celeXA        Dose:  10 mg   Take 10 mg by mouth every evening   Refills:  0       ELIQUIS 5 MG tablet   Generic drug:  apixaban ANTICOAGULANT        Dose:  5 mg   Take 5 mg by mouth 2 times daily   Refills:  0       finasteride 5 MG tablet   Commonly known as:  PROSCAR        Dose:  5 mg   Take 5 mg by mouth daily   Refills:  0       LIPITOR 20 MG tablet   Generic drug:  atorvastatin        Dose:  20 mg   Take 20 mg by mouth daily   Refills:  0       metFORMIN 500 MG 24 hr tablet   Commonly known as:  GLUCOPHAGE-XR        Dose:  500 mg   Take 500 mg by mouth daily (with dinner)   Refills:  0       metoprolol succinate 50 MG 24 hr tablet   Commonly known as:  TOPROL-XL        Dose:  50 mg   Take 50 mg by mouth 2 times daily   Refills:  0       montelukast 10 MG tablet   Commonly known as:  SINGULAIR        Dose:  10 mg   Take 10 mg by mouth At Bedtime   Refills:  0       NIFEdipine ER 60 MG 24 hr tablet   Commonly known as:  ADALAT CC        Dose:  60 mg   Take 60 mg by  mouth every evening   Refills:  0       potassium chloride SA 10 MEQ CR tablet   Commonly known as:  K-DUR/KLOR-CON M        Dose:  10 mEq   Take 10 mEq by mouth daily   Refills:  0       ramipril 10 MG capsule   Commonly known as:  ALTACE        Dose:  10 mg   Take 10 mg by mouth every evening   Refills:  0       tamsulosin 0.4 MG capsule   Commonly known as:  FLOMAX        Dose:  0.4 mg   Take 0.4 mg by mouth every evening   Refills:  0       tiotropium 18 MCG capsule   Commonly known as:  SPIRIVA        Dose:  18 mcg   Inhale 18 mcg into the lungs daily   Refills:  0       * Notice:  This list has 2 medication(s) that are the same as other medications prescribed for you. Read the directions carefully, and ask your doctor or other care provider to review them with you.         Where to get your medicines      These medications were sent to [facility] [address]  [address] [facility] [address]     Phone:  [phone]      predniSONE 10 MG tablet               DISCHARGE CONDITION:  Stable.      PHYSICAL EXAMINATION:   DISCHARGE VITAL SIGNS:  Blood pressure 140/74, pulse rate 66, temperature 96, oxygen saturation 98%.   HEENT:  Pink, anicteric.  Moist oral mucosa.   NECK:  Supple, no JVD, no thyromegaly.   CHEST:  Diffuse bilateral wheezing with prolonged expiratory phases, no crackles.   CARDIOVASCULAR:  S1, S2 were heard.  No gallop or murmur.   ABDOMEN:  Obese, soft, nontender, nondistended.   EXTREMITIES:  No edema, cyanosis or clubbing.   NEUROLOGIC:  No focal neurologic deficit.  Cranial nerves grossly intact.   PSYCHIATRIC:  Normal mood and affect, keeps eye contact.      DIAGNOSTIC TESTS OF INTEREST:  Electrolytes normal.  Hemoglobin 11.7, WBC 7.8, platelet 143.  Urinalysis negative.    Chest x-ray did not show any acute pathology.    Echocardiogram repeated at this time showed significant improvement from previous study.  EF is 45%, on the  previous study it was 20%-25%.      DISCHARGE DIET:  2 gm sadium diet.      DISCHARGE ACTIVITY:  As tolerated.      DISCHARGE FOLLOWUP:    Primary care provider in 1 week.    The patient will have follow up with his cardiologist.  Pulmonologist follow up advised.     BRIEF HISTORY AND HOSPITAL COURSE:  Carlos Alberto Olivo is a 78-year-old gentleman with history of chronic obstructive pulmonary disease, on home oxygen 2.5 liters; chronic systolic congestive heart failure; A-fib on Eliquis status post ICD and AV woody ablation; hypertension admitted on 2018 with acute on chronic respiratory failure.  The patient has underlying chronic respiratory failure and on home oxygen 2.5 liters around the clock.  This time he became more short of breath and required more oxygen and it was suspected, likely triggered by viral infection.  He was admitted, started on bronchodilators, steroids and oxygen. The patient uses BiPAP at night at home.  He used BiPAP for a few hours at night while he was in the hospital, he remained off BiPAP daytime.  I advised for this patient to continue his BiPAP at home at night.  His oxygen is at baseline 2.5 liters per minute.  Overall respiratory status improved.  His heart failure is also significantly improved.  There is no sign of exacerbation.  The patient was discharged on daily dose of Lasix, bronchodilators, home medications and advised to follow up.  All his questions and concerns addressed.  The patient was reluctant to be discharged, but after he got up and ambulated he felt better and discharged.      Total time spent coordinating his discharge face-to-face was over 35 minutes.         PEPE PEDERSON MD             D: 2018   T: 2018   MT: YUE      Name:     CARLOS ALBERTO OLIVO   MRN:      [id number]        Account:        [id number]   :      1940           Admit Date:     2018                                  Discharge Date: 2018      Document: [id number]

## 2021-11-11 NOTE — PLAN OF CARE
Problem: Patient Care Overview  Goal: Plan of Care/Patient Progress Review  Outcome: No Change  VSS. Afebrile. Continues to wear baseline 2.5 L oxygen in high 90s. LS diminished, pt reports shortness of breath while at rest and dyspnea on exertion. Pt denies pain and nausea. Mod CHO, 2 gm Sodium diet. Independently ambulating. PIV - SL. Getting IV Solumedrol. A&O, calls appropriately for assistance. Telemetry monitoring. Contact precautions in place. DC pending, 1-2 days to home.     Problem: Diabetes Comorbidity  Goal: Diabetes  Patient comorbidity will be monitored for signs and symptoms of hyperglycemia or hypoglycemia. Problems will be absent, minimized or managed by discharge/transition of care.   Outcome: No Change  BG during night was 184.        PAST SURGICAL HISTORY:  No significant past surgical history
